# Patient Record
Sex: FEMALE | Race: WHITE | NOT HISPANIC OR LATINO | Employment: FULL TIME | URBAN - NONMETROPOLITAN AREA
[De-identification: names, ages, dates, MRNs, and addresses within clinical notes are randomized per-mention and may not be internally consistent; named-entity substitution may affect disease eponyms.]

---
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
## 2018-04-13 ENCOUNTER — TELEPHONE (OUTPATIENT)
Dept: INTERNAL MEDICINE | Facility: CLINIC | Age: 18
End: 2018-04-13

## 2018-04-13 NOTE — TELEPHONE ENCOUNTER
Patient called requesting a referral to Dermatology for her acne.  She states she would like to see Dr. Bui if possible.

## 2018-04-17 DIAGNOSIS — L70.0 ACNE VULGARIS: Primary | ICD-10-CM

## 2018-05-31 ENCOUNTER — OFFICE VISIT (OUTPATIENT)
Dept: INTERNAL MEDICINE | Facility: CLINIC | Age: 18
End: 2018-05-31

## 2018-05-31 VITALS
BODY MASS INDEX: 22.32 KG/M2 | HEIGHT: 63 IN | SYSTOLIC BLOOD PRESSURE: 108 MMHG | TEMPERATURE: 99.2 F | HEART RATE: 81 BPM | OXYGEN SATURATION: 99 % | DIASTOLIC BLOOD PRESSURE: 60 MMHG | WEIGHT: 126 LBS

## 2018-05-31 DIAGNOSIS — J02.0 STREP PHARYNGITIS: Primary | ICD-10-CM

## 2018-05-31 LAB
EXPIRATION DATE: ABNORMAL
INTERNAL CONTROL: ABNORMAL
Lab: ABNORMAL
S PYO AG THROAT QL: POSITIVE

## 2018-05-31 PROCEDURE — 87880 STREP A ASSAY W/OPTIC: CPT | Performed by: PHYSICIAN ASSISTANT

## 2018-05-31 PROCEDURE — 99213 OFFICE O/P EST LOW 20 MIN: CPT | Performed by: PHYSICIAN ASSISTANT

## 2018-05-31 RX ORDER — AMOXICILLIN 875 MG/1
875 TABLET, COATED ORAL 2 TIMES DAILY
Qty: 20 TABLET | Refills: 0 | Status: SHIPPED | OUTPATIENT
Start: 2018-05-31 | End: 2019-01-29

## 2018-05-31 NOTE — PROGRESS NOTES
Tatyana Maldonado is a 17 y.o. female.     Subjective   History of Present Illness   Here today with concern of 2-3 days of intense sore throat and bilateral ear pain. She has also had some postnasal drip and cough for the last 5+ days which she is taking antihistamines for. She denies headaches, abdominal pain, fever, chills or SOA.     The following portions of the patient's history were reviewed and updated as appropriate: allergies, current medications, past family history, past medical history, past social history, past surgical history and problem list.    Review of Systems    Constitutional: Negative for appetite change, chills, fatigue, fever and unexpected weight change.   HENT:  postnasal drip, rhinorrhea, sore throat, ear pain.  Negative for congestion,hearing loss, nosebleeds, tinnitus and trouble swallowing.    Eyes: Negative for photophobia, discharge and visual disturbance.   Respiratory:  Cough. Negative for chest tightness, shortness of breath and wheezing.    Cardiovascular: Negative for chest pain, palpitations and leg swelling.   Gastrointestinal: Negative for abdominal distention, abdominal pain, blood in  stool, constipation, diarrhea, nausea and vomiting.   Endocrine: Negative for cold intolerance, heat intolerance, polydipsia, polyphagia and polyuria.   Musculoskeletal: Negative for arthralgias, back pain, joint swelling, myalgias, neck pain and neck stiffness.   Skin: Negative for color change, pallor, rash and wound.   Allergic/Immunologic: Negative for environmental allergies, food allergies and immunocompromised state.   Neurological: Negative for dizziness, tremors, seizures, weakness, numbness and headaches.   Hematological: Negative for adenopathy. Does not bruise/bleed easily.   Psychiatric/Behavioral: Negative for sleep disturbances, agitation, behavioral problems, confusion, hallucinations, self-injury and suicidal ideas. The patient is not nervous/anxious.      Objective    Physical  "Exam  Constitutional: Oriented to person, place, and time. Appears well-developed and well-nourished.   HENT: OP erythema without petechiae or exudate. Absent tonsils. TMs normal.   Head: Normocephalic and atraumatic.   Eyes: EOM are normal. Pupils are equal, round, and reactive to light.   Neck: Normal range of motion. Neck supple.   Cardiovascular: Normal rate, regular rhythm and normal heart sounds.    Pulmonary/Chest: Effort normal and breath sounds normal. No respiratory distress.  Has no wheezes or rales. Exhibits no chest wall tenderness.   Abdominal: Soft. Bowel sounds are normal. Exhibits no distension and no mass. There is no tenderness.   Musculoskeletal: Normal range of motion. Exhibits no tenderness.   Neurological: Alert and oriented to person, place, and time.   Skin: Skin is warm and dry.   Psychiatric: Has a normal mood and affect. Behavior is normal. Judgment and thought content normal.       /60   Pulse 81   Temp 99.2 °F (37.3 °C)   Ht 160 cm (62.99\")   Wt 57.2 kg (126 lb)   SpO2 99%   BMI 22.33 kg/m²     Nursing note and vitals reviewed.        Assessment/Plan   Tatyana was seen today for sore throat, sinus problem and cough.    Diagnoses and all orders for this visit:    Strep pharyngitis  -     amoxicillin (AMOXIL) 875 MG tablet; Take 1 tablet by mouth 2 (Two) Times a Day.  -     POC Rapid Strep A - positive    Increase PO fluid intake. Ibuprofen and/or Tylenol prn for pain and fever control.   Begin using new toothbrush after 2 days of antibiotic use.     Call or RTC if symptoms worsen or persist.                "

## 2018-07-10 ENCOUNTER — CLINICAL SUPPORT (OUTPATIENT)
Dept: INTERNAL MEDICINE | Facility: CLINIC | Age: 18
End: 2018-07-10

## 2018-07-10 ENCOUNTER — TELEPHONE (OUTPATIENT)
Dept: INTERNAL MEDICINE | Facility: CLINIC | Age: 18
End: 2018-07-10

## 2018-07-10 DIAGNOSIS — Z11.1 TUBERCULOSIS SCREENING: Primary | ICD-10-CM

## 2018-07-10 DIAGNOSIS — Z11.1 TUBERCULOSIS SCREENING: ICD-10-CM

## 2018-07-10 PROCEDURE — 86580 TB INTRADERMAL TEST: CPT | Performed by: PHYSICIAN ASSISTANT

## 2018-07-12 LAB
INDURATION: 0 MM (ref 0–10)
TB SKIN TEST: NEGATIVE

## 2019-01-29 ENCOUNTER — OFFICE VISIT (OUTPATIENT)
Dept: INTERNAL MEDICINE | Facility: CLINIC | Age: 19
End: 2019-01-29

## 2019-01-29 VITALS
TEMPERATURE: 98.5 F | HEIGHT: 63 IN | OXYGEN SATURATION: 99 % | BODY MASS INDEX: 22.32 KG/M2 | SYSTOLIC BLOOD PRESSURE: 110 MMHG | WEIGHT: 126 LBS | DIASTOLIC BLOOD PRESSURE: 70 MMHG | HEART RATE: 110 BPM

## 2019-01-29 DIAGNOSIS — N94.6 DYSMENORRHEA: Primary | ICD-10-CM

## 2019-01-29 PROCEDURE — 99213 OFFICE O/P EST LOW 20 MIN: CPT | Performed by: PHYSICIAN ASSISTANT

## 2019-01-29 RX ORDER — INFLUENZA A VIRUS A/SINGAPORE/GP1908/2015 IVR-180 (H1N1) ANTIGEN (MDCK CELL DERIVED, PROPIOLACTONE INACTIVATED), INFLUENZA A VIRUS A/NORTH CAROLINA/04/2016 (H3N2) HEMAGGLUTININ ANTIGEN (MDCK CELL DERIVED, PROPIOLACTONE INACTIVATED), INFLUENZA B VIRUS B/IOWA/06/2017 HEMAGGLUTININ ANTIGEN (MDCK CELL DERIVED, PROPIOLACTONE INACTIVATED), INFLUENZA B VIRUS B/SINGAPORE/INFTT-16-0610/2016 HEMAGGLUTININ ANTIGEN (MDCK CELL DERIVED, PROPIOLACTONE INACTIVATED) 15; 15; 15; 15 UG/.5ML; UG/.5ML; UG/.5ML; UG/.5ML
INJECTION, SUSPENSION INTRAMUSCULAR
Refills: 0 | COMMUNITY
Start: 2018-10-29 | End: 2019-01-29

## 2019-01-29 RX ORDER — ISOTRETINOIN 30 MG/1
30 CAPSULE, LIQUID FILLED ORAL 2 TIMES DAILY
Refills: 0 | COMMUNITY
Start: 2019-01-16 | End: 2020-06-25

## 2019-02-01 ENCOUNTER — TELEPHONE (OUTPATIENT)
Dept: INTERNAL MEDICINE | Facility: CLINIC | Age: 19
End: 2019-02-01

## 2019-02-01 RX ORDER — LEVONORGESTREL / ETHINYL ESTRADIOL AND ETHINYL ESTRADIOL 150-30(84)
1 KIT ORAL DAILY
Qty: 91 EACH | Refills: 3 | Status: SHIPPED | OUTPATIENT
Start: 2019-02-01 | End: 2019-02-11

## 2019-02-01 NOTE — TELEPHONE ENCOUNTER
I just sent her prescription. If it is denied please do a PA since she has been stable on this for over 1 year.

## 2019-02-05 ENCOUNTER — OFFICE VISIT (OUTPATIENT)
Dept: INTERNAL MEDICINE | Facility: CLINIC | Age: 19
End: 2019-02-05

## 2019-02-05 VITALS
WEIGHT: 126 LBS | SYSTOLIC BLOOD PRESSURE: 114 MMHG | OXYGEN SATURATION: 99 % | HEART RATE: 101 BPM | BODY MASS INDEX: 22.32 KG/M2 | DIASTOLIC BLOOD PRESSURE: 72 MMHG | TEMPERATURE: 98.4 F | HEIGHT: 63 IN

## 2019-02-05 DIAGNOSIS — J02.9 ACUTE PHARYNGITIS, UNSPECIFIED ETIOLOGY: Primary | ICD-10-CM

## 2019-02-05 PROBLEM — J06.9 ACUTE URI: Status: RESOLVED | Noted: 2017-12-28 | Resolved: 2019-02-05

## 2019-02-05 LAB
EXPIRATION DATE: NORMAL
EXPIRATION DATE: NORMAL
HETEROPH AB SER QL LA: NEGATIVE
INTERNAL CONTROL: NORMAL
INTERNAL CONTROL: NORMAL
Lab: NORMAL
Lab: NORMAL
S PYO AG THROAT QL: NEGATIVE

## 2019-02-05 PROCEDURE — 86308 HETEROPHILE ANTIBODY SCREEN: CPT | Performed by: PHYSICIAN ASSISTANT

## 2019-02-05 PROCEDURE — 87880 STREP A ASSAY W/OPTIC: CPT | Performed by: PHYSICIAN ASSISTANT

## 2019-02-05 PROCEDURE — 99213 OFFICE O/P EST LOW 20 MIN: CPT | Performed by: PHYSICIAN ASSISTANT

## 2019-02-05 RX ORDER — AMOXICILLIN 875 MG/1
875 TABLET, COATED ORAL 2 TIMES DAILY
Qty: 20 TABLET | Refills: 0 | Status: SHIPPED | OUTPATIENT
Start: 2019-02-05 | End: 2019-03-20

## 2019-02-11 RX ORDER — LEVONORGESTREL AND ETHINYL ESTRADIOL 0.15-0.03
1 KIT ORAL DAILY
Qty: 91 TABLET | Refills: 4 | Status: SHIPPED | OUTPATIENT
Start: 2019-02-11 | End: 2019-05-13

## 2019-03-20 ENCOUNTER — OFFICE VISIT (OUTPATIENT)
Dept: OBSTETRICS AND GYNECOLOGY | Facility: CLINIC | Age: 19
End: 2019-03-20

## 2019-03-20 VITALS
HEIGHT: 63 IN | SYSTOLIC BLOOD PRESSURE: 118 MMHG | BODY MASS INDEX: 22.39 KG/M2 | WEIGHT: 126.4 LBS | DIASTOLIC BLOOD PRESSURE: 70 MMHG

## 2019-03-20 DIAGNOSIS — N92.6 IRREGULAR MENSES: Primary | ICD-10-CM

## 2019-03-20 PROCEDURE — 99203 OFFICE O/P NEW LOW 30 MIN: CPT | Performed by: OBSTETRICS & GYNECOLOGY

## 2019-03-20 RX ORDER — GLYCOPYRROLATE 2 MG/1
2 TABLET ORAL 3 TIMES DAILY
COMMUNITY
End: 2019-12-09 | Stop reason: SDUPTHER

## 2019-03-20 NOTE — PROGRESS NOTES
Subjective   Chief Complaint   Patient presents with   • \[facility]\"" Care     new pt. pt here for birth control. states that she has some bad side effects from the last birth control that she was on and stopped taking them about a month ago. pt states that she did have some irregular bleeding while on her birth control as well.     Tatyana Maldonado is a 18 y.o. year old .  Patient's last menstrual period was 2019 (exact date).  She presents to be seen because of irregular menses.  Her gynecologist had told her that she possibly had endometriosis given some heavy periods and painful periods in the past.  She had been on a triphasic birth control pill for about a year.  Of note she is also taking isotretinoin.  She is virginal.  A couple months ago she had an episode of bloody mucus a week prior to her placebo week and then had pain with heavier bleeding that lasted about 5 days in duration.  Her primary care doctor told her to skip her head to the placebo week and then start the new pack.  She did have resumption of bleeding at that time.  Due to her insurance she had to discuss a different birth control pill option with her primary care physician's assistant. She prescribed 0.15mg/0.03mg due to the patient's insurance however patient only took this for about 1 week due to headaches.  Occurred about 1 month ago and she is currently not taking any contraception and her last.  Was .  Duration was 5 days.  She desires to see how her bleeding and pain is without being on the birth control pill.    OTHER THINGS SHE WANTS TO DISCUSS TODAY:  Nothing else    The following portions of the patient's history were reviewed and updated as appropriate:current medications, allergies, past family history, past medical history, past social history and past surgical history    Social History    Tobacco Use      Smoking status: Never Smoker    Review of Systems  Constitutional POS: nothing reported    NEG: anorexia  "or night sweats   Genitourinary POS: nothing reported    NEG: dysuria or hematuria   Gastointestinal POS: nothing reported    NEG: bloating, change in bowel habits, melena or reflux symptoms   Integument POS: nothing reported    NEG: moles that are changing in size, shape, color or rashes   Breast POS: nothing reported    NEG: persistent breast lump, skin dimpling or nipple discharge         Objective   /70   Ht 160 cm (62.99\")   Wt 57.3 kg (126 lb 6.4 oz)   LMP 02/26/2019 (Exact Date)   Breastfeeding? No   BMI 22.40 kg/m²     General:  well developed; well nourished  no acute distress                                 Lab Review   No data reviewed    Imaging   No data reviewed        Assessment   1. Irregular menses, improved     Plan   1. Reviewed the importance of contraception while taking isotretinoin if she does become sexually active.  2. Reviewed that irregular bleeding can be normal while on a birth control pill.  As long as pregnancy and STDs are ruled out we usually just monitor closely.  3. Urged patient to find out when she last got her Gardasil vaccine and how many vaccine she is due for.  She will call the office and let us know.  4. Viewed importance of starting cervical cancer screening with Paps at age 21.  5. The importance of keeping all planned follow-up and taking all medications as prescribed was emphasized.  6. Follow up for annual exam in 6  months to start paps    No orders of the defined types were placed in this encounter.         This note was electronically signed.    Lupis Geller MD  March 20, 2019    Note: Speech recognition transcription software may have been used to create portions of this document.  An attempt at proofreading has been made but errors in transcription could still be present.  "

## 2019-08-21 ENCOUNTER — OFFICE VISIT (OUTPATIENT)
Dept: INTERNAL MEDICINE | Facility: CLINIC | Age: 19
End: 2019-08-21

## 2019-08-21 VITALS
HEART RATE: 89 BPM | OXYGEN SATURATION: 100 % | BODY MASS INDEX: 22.32 KG/M2 | DIASTOLIC BLOOD PRESSURE: 67 MMHG | RESPIRATION RATE: 15 BRPM | SYSTOLIC BLOOD PRESSURE: 114 MMHG | TEMPERATURE: 98.3 F | WEIGHT: 126 LBS | HEIGHT: 63 IN

## 2019-08-21 DIAGNOSIS — R30.0 DYSURIA: Primary | ICD-10-CM

## 2019-08-21 DIAGNOSIS — M54.50 ACUTE BILATERAL LOW BACK PAIN WITHOUT SCIATICA: ICD-10-CM

## 2019-08-21 LAB
BILIRUB BLD-MCNC: NEGATIVE MG/DL
CLARITY, POC: ABNORMAL
COLOR UR: YELLOW
GLUCOSE UR STRIP-MCNC: NEGATIVE MG/DL
KETONES UR QL: NEGATIVE
LEUKOCYTE EST, POC: NEGATIVE
NITRITE UR-MCNC: NEGATIVE MG/ML
PH UR: 6 [PH] (ref 5–8)
PROT UR STRIP-MCNC: NEGATIVE MG/DL
RBC # UR STRIP: NEGATIVE /UL
SP GR UR: 1.01 (ref 1–1.03)
UROBILINOGEN UR QL: NORMAL

## 2019-08-21 PROCEDURE — 99213 OFFICE O/P EST LOW 20 MIN: CPT | Performed by: NURSE PRACTITIONER

## 2019-08-21 NOTE — PROGRESS NOTES
Chief Complaint / Reason:      Chief Complaint   Patient presents with   • Back Pain     fatigue and weakeness, fever       Subjective     HPI  Patient presents today with complaints of back pain, urinary frequency fatigue and fever and chills.  She states that symptoms started in the past few days and she noticed she had not been drinking enough water and started drinking more and she did start to feel somewhat better.  She denies any blood in urine but states that her urine was very concentrated.  Patient is on Accutane and does take birth control therefore her mouth is very dry.  Patient denies any history of kidney stones.  She does drink caffeine daily but states that she tries to limit her caffeine intake.  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  Patient denies any chance of pregnancy and states that she has never been sexually active.  She denies any vaginal discharge.   had some burning and urinary frequency.  Does have a history of ovarian cyst.  Denies constipation but reports occasional diarrhea.  History taken from: patient    PMH/FH/Social History were reviewed and updated appropriately in the electronic medical record.   Past Medical History:   Diagnosis Date   • Endometriosis    • GARDASIL COMPLETED #1/3     Thinks she has had 2 doses    • Ovarian cyst     history of cyst rupture      Past Surgical History:   Procedure Laterality Date   • CHOLECYSTECTOMY     • TONSILLECTOMY       Social History     Socioeconomic History   • Marital status: Single     Spouse name: Not on file   • Number of children: Not on file   • Years of education: Not on file   • Highest education level: Not on file   Tobacco Use   • Smoking status: Never Smoker   Substance and Sexual Activity   • Alcohol use: No   • Drug use: No   • Sexual activity: No     Family History   Problem Relation Age of Onset   • Diabetes Paternal Grandfather    • Diabetes Paternal Grandmother    • Breast cancer  Paternal Great-Grandmother 70   • Ovarian cancer Neg Hx    • Uterine cancer Neg Hx    • Colon cancer Neg Hx        Review of Systems:   Review of Systems   Constitutional: Positive for fatigue and fever.   Respiratory: Negative.    Cardiovascular: Negative.    Gastrointestinal: Positive for abdominal pain.   Genitourinary: Positive for dysuria and urgency.   Musculoskeletal: Positive for back pain.   Neurological: Negative.    Psychiatric/Behavioral: Positive for sleep disturbance and stress.         All other systems were reviewed and are negative.  Exceptions are noted in the subjective or above.      Objective     Vital Signs  Vitals:    08/21/19 1720   BP: 114/67   Pulse: 89   Resp: 15   Temp: 98.3 °F (36.8 °C)   SpO2: 100%       Body mass index is 22.32 kg/m².    Physical Exam   Constitutional: She is oriented to person, place, and time. She appears well-developed and well-nourished.   Cardiovascular: Normal rate, regular rhythm, normal heart sounds and intact distal pulses.   Pulmonary/Chest: Effort normal and breath sounds normal.   Abdominal: Soft. Bowel sounds are normal. There is tenderness in the right lower quadrant. There is no rigidity, no rebound, no guarding and no CVA tenderness.   Musculoskeletal: Normal range of motion. She exhibits tenderness.   Neurological: She is alert and oriented to person, place, and time. Coordination normal.   Skin: Skin is warm and dry. Capillary refill takes less than 2 seconds.   Psychiatric: She has a normal mood and affect. Her behavior is normal. Judgment and thought content normal.   Nursing note and vitals reviewed.           Results Review:    I reviewed the patient's new clinical results.   Office Visit on 08/21/2019   Component Date Value Ref Range Status   • Color 08/21/2019 Yellow  Yellow, Straw, Dark Yellow, Ladonna Final   • Clarity, UA 08/21/2019 Cloudy* Clear Final   • Specific Gravity  08/21/2019 1.015  1.005 - 1.030 Final   • pH, Urine 08/21/2019 6.0  5.0  - 8.0 Final   • Leukocytes 08/21/2019 Negative  Negative Final   • Nitrite, UA 08/21/2019 Negative  Negative Final   • Protein, POC 08/21/2019 Negative  Negative mg/dL Final   • Glucose, UA 08/21/2019 Negative  Negative, 1000 mg/dL (3+) mg/dL Final   • Ketones, UA 08/21/2019 Negative  Negative Final   • Urobilinogen, UA 08/21/2019 Normal  Normal Final   • Bilirubin 08/21/2019 Negative  Negative Final   • Blood, UA 08/21/2019 Negative  Negative Final         Medication Review:     Current Outpatient Medications:   •  Acetaminophen (TYLENOL PO), Take  by mouth., Disp: , Rfl:   •  Aspirin-Acetaminophen-Caffeine (EXCEDRIN PO), Take  by mouth., Disp: , Rfl:   •  glycopyrrolate (ROBINUL) 2 MG tablet, Take 2 mg by mouth 3 (Three) Times a Day., Disp: , Rfl:   •  IBUPROFEN PO, Take  by mouth., Disp: , Rfl:   •  MYORISAN 30 MG capsule, Take 30 mg by mouth 2 (Two) Times a Day., Disp: , Rfl: 0    Assessment/Plan   Tatyana was seen today for back pain.    Diagnoses and all orders for this visit:    Dysuria  -     POCT urinalysis dipstick, automated  -     Urine Culture - Urine, Urine, Clean Catch  Discussed worsening signs and symptoms with patient.  Recommend patient increase water intake and limit caffeine intake  Acute bilateral low back pain without sciatica  -     POCT urinalysis dipstick, automated  Recommend taking Motrin for fever or pain.  Discussed worsening signs and symptoms.       Return if symptoms worsen or fail to improve.    Maria Antonia Brasher, NIKUNJ  08/21/2019

## 2019-08-24 ENCOUNTER — OFFICE VISIT (OUTPATIENT)
Dept: INTERNAL MEDICINE | Facility: CLINIC | Age: 19
End: 2019-08-24

## 2019-08-24 VITALS
TEMPERATURE: 98.4 F | SYSTOLIC BLOOD PRESSURE: 128 MMHG | HEART RATE: 129 BPM | HEIGHT: 63 IN | WEIGHT: 126 LBS | BODY MASS INDEX: 22.32 KG/M2 | DIASTOLIC BLOOD PRESSURE: 70 MMHG | OXYGEN SATURATION: 100 %

## 2019-08-24 DIAGNOSIS — N75.1 BARTHOLIN'S GLAND ABSCESS: Primary | ICD-10-CM

## 2019-08-24 LAB
BACTERIA UR CULT: NO GROWTH
BACTERIA UR CULT: NORMAL

## 2019-08-24 PROCEDURE — 99213 OFFICE O/P EST LOW 20 MIN: CPT | Performed by: PHYSICIAN ASSISTANT

## 2019-08-24 RX ORDER — AMOXICILLIN AND CLAVULANATE POTASSIUM 875; 125 MG/1; MG/1
1 TABLET, FILM COATED ORAL 2 TIMES DAILY
Qty: 20 TABLET | Refills: 0 | Status: SHIPPED | OUTPATIENT
Start: 2019-08-24 | End: 2019-09-03

## 2019-08-24 NOTE — PROGRESS NOTES
Tatyana Maldonado is a 19 y.o. female.     Subjective   History of Present Illness   Here today with concern of pain and swelling in the left side of the external vaginal area.  She has been experiencing the symptoms for the last 3 days which worsened yesterday but is a little bit better today.  Yesterday she soaked in a tub of warm water which seemed to help some.  She has had swelling of the left sided Bartholin gland at least twice in the past although this time is different because she can see a white lesion in the area which has previously never been present.  She has never been sexually active.  No fever, chills or nausea.        The following portions of the patient's history were reviewed and updated as appropriate: allergies, current medications, past family history, past medical history, past social history, past surgical history and problem list.    Review of Systems   Constitutional: Negative for appetite change, chills, fatigue, fever and unexpected weight change.   Respiratory: Negative for cough, chest tightness, shortness of breath and wheezing.    Cardiovascular: Negative for chest pain, palpitations and leg swelling.   Gastrointestinal: Negative for blood in stool and nausea.   Genitourinary: Positive for genital sores and pelvic pain. Negative for difficulty urinating, dyspareunia, flank pain, vaginal bleeding and vaginal pain.   Allergic/Immunologic: Negative for immunocompromised state.   Neurological: Negative for dizziness, tremors, seizures, facial asymmetry, weakness and headaches.   Hematological: Negative for adenopathy. Does not bruise/bleed easily.   Psychiatric/Behavioral: Negative for agitation, confusion, dysphoric mood, self-injury and suicidal ideas. The patient is not nervous/anxious.          Objective    Physical Exam   Constitutional: She is oriented to person, place, and time. She appears well-developed and well-nourished. No distress.   Pulmonary/Chest: Effort normal.  "  Genitourinary:       Pelvic exam was performed with patient prone.   Neurological: She is alert and oriented to person, place, and time. Coordination normal.   Skin: She is not diaphoretic.   Psychiatric: She has a normal mood and affect. Her behavior is normal. Judgment and thought content normal.   Nursing note and vitals reviewed.        /70   Pulse (!) 129   Temp 98.4 °F (36.9 °C)   Ht 160 cm (62.99\")   Wt 57.2 kg (126 lb)   SpO2 100%   BMI 22.33 kg/m²     Nursing note and vitals reviewed.          Assessment/Plan   Tatyana was seen today for spot in groin area.    Diagnoses and all orders for this visit:    Bartholin's gland abscess  -     amoxicillin-clavulanate (AUGMENTIN) 875-125 MG per tablet; Take 1 tablet by mouth 2 (Two) Times a Day for 10 days.  -     Virus Culture - Swab, Bartholin Cyst  -     Culture, Routine - Swab, Bartholin Cyst      She was encouraged to soak in a tub with warm water, Epsom salt and antibacterial soap at least twice daily until symptoms resolve.  Return in 2 days if symptoms have failed to improve any at which time incision and drainage of the abscess may be attempted.    ER with any acutely worsening symptoms.           "

## 2019-08-26 ENCOUNTER — OFFICE VISIT (OUTPATIENT)
Dept: INTERNAL MEDICINE | Facility: CLINIC | Age: 19
End: 2019-08-26

## 2019-08-26 VITALS
HEIGHT: 63 IN | BODY MASS INDEX: 22.32 KG/M2 | DIASTOLIC BLOOD PRESSURE: 70 MMHG | WEIGHT: 126 LBS | OXYGEN SATURATION: 99 % | TEMPERATURE: 99 F | SYSTOLIC BLOOD PRESSURE: 108 MMHG | HEART RATE: 84 BPM

## 2019-08-26 DIAGNOSIS — N75.1 BARTHOLIN'S GLAND ABSCESS: Primary | ICD-10-CM

## 2019-08-26 PROCEDURE — 99212 OFFICE O/P EST SF 10 MIN: CPT | Performed by: PHYSICIAN ASSISTANT

## 2019-08-26 NOTE — PROGRESS NOTES
Tatyana Maldonado is a 19 y.o. female.     Subjective   History of Present Illness   Here today for follow-up of left-sided Bartholin gland abscess.  2 days ago she was seen here and Bartholin gland abscess was noted for which she was prescribed Augmentin and she encouraged to soak in warm water with Epsom salt and antibacterial soap a couple of times per day.  Yesterday she felt particularly bad with bothersome pain in the external vaginal area which continued through this morning, although since then she has felt significantly better and is no longer having any pain. She was previously experiencing external vaginal pain with urination which has also since resolved.  She denies any fever today.         The following portions of the patient's history were reviewed and updated as appropriate: allergies, current medications, past family history, past medical history, past social history, past surgical history and problem list.    Review of Systems   Constitutional: Negative for appetite change, chills, fatigue, fever and unexpected weight change.   Eyes: Negative for visual disturbance.   Respiratory: Negative for cough, chest tightness, shortness of breath and wheezing.    Cardiovascular: Negative for chest pain, palpitations and leg swelling.   Genitourinary: Positive for dysuria and genital sores. Negative for difficulty urinating, dyspareunia, flank pain, frequency, pelvic pain, urgency, vaginal discharge and vaginal pain.   Musculoskeletal: Negative for arthralgias, gait problem, neck pain and neck stiffness.   Skin: Negative for pallor and rash.   Allergic/Immunologic: Negative for immunocompromised state.   Neurological: Negative for dizziness, seizures, speech difficulty, weakness, light-headedness and headaches.   Hematological: Negative for adenopathy. Does not bruise/bleed easily.   Psychiatric/Behavioral: Negative for agitation, behavioral problems, dysphoric mood, self-injury and suicidal ideas. The patient  "is not nervous/anxious.          Objective    Physical Exam   Constitutional: She is oriented to person, place, and time. She appears well-developed and well-nourished. No distress.   HENT:   Head: Normocephalic and atraumatic.   Pulmonary/Chest: Effort normal.   Genitourinary:       There is tenderness and lesion on the left labia.   Neurological: She is alert and oriented to person, place, and time. Coordination normal.   Skin: She is not diaphoretic.   Psychiatric: She has a normal mood and affect. Her behavior is normal. Judgment and thought content normal.   Nursing note and vitals reviewed.        /70   Pulse 84   Temp 99 °F (37.2 °C)   Ht 160 cm (62.99\")   Wt 57.2 kg (126 lb)   SpO2 99%   BMI 22.33 kg/m²     Nursing note and vitals reviewed.          Assessment/Plan   Tatyana was seen today for follow-up.    Diagnoses and all orders for this visit:    Bartholin's gland abscess    symptoms improving with antibiotic use and with frequent soaks in warm water with Epsom salt and antibacterial soap. She was encouraged to continue both. Culture of the site has been obtained and she will be contacted once results are available for review.              "

## 2019-09-04 LAB
BACTERIA SPEC AEROBE CULT: NORMAL
BACTERIA SPEC CULT: NORMAL
VIRUS SPEC CULT: NORMAL

## 2019-09-25 PROBLEM — Z01.419 WELL WOMAN EXAM: Status: ACTIVE | Noted: 2019-09-25

## 2019-09-27 ENCOUNTER — OFFICE VISIT (OUTPATIENT)
Dept: OBSTETRICS AND GYNECOLOGY | Facility: CLINIC | Age: 19
End: 2019-09-27

## 2019-09-27 VITALS
HEIGHT: 63 IN | DIASTOLIC BLOOD PRESSURE: 80 MMHG | BODY MASS INDEX: 22.15 KG/M2 | WEIGHT: 125 LBS | SYSTOLIC BLOOD PRESSURE: 132 MMHG

## 2019-09-27 DIAGNOSIS — Z01.419 WELL WOMAN EXAM: Primary | ICD-10-CM

## 2019-09-27 DIAGNOSIS — Z23 NEED FOR HPV VACCINE: ICD-10-CM

## 2019-09-27 DIAGNOSIS — Z12.4 CERVICAL CANCER SCREENING: ICD-10-CM

## 2019-09-27 PROCEDURE — 90471 IMMUNIZATION ADMIN: CPT | Performed by: OBSTETRICS & GYNECOLOGY

## 2019-09-27 PROCEDURE — 99395 PREV VISIT EST AGE 18-39: CPT | Performed by: OBSTETRICS & GYNECOLOGY

## 2019-09-27 PROCEDURE — 90651 9VHPV VACCINE 2/3 DOSE IM: CPT | Performed by: OBSTETRICS & GYNECOLOGY

## 2019-09-27 NOTE — PROGRESS NOTES
Subjective   Chief Complaint   Patient presents with   • Gynecologic Exam     pt here for annual. pt states no c/o     Tatyana Maldonado is a 19 y.o. year old  presenting to be seen for her annual exam.     SEXUAL Hx:  She is not currently sexually active.  In the past year there she has not been sexually active.    Condoms are not needed because she is not sexually active.  She would not like to be screened for STD's at today's exam.  Current birth control method: abstinence.  She is happy with her current method of contraception and does not want to discuss alternative methods of contraception.  MENSTRUAL Hx:  Patient's last menstrual period was 2019 (within days).  In the past 6 months her cycles have been regular, predictable and occur monthly.  Her menstrual flow is typically normal.   Each month on average there are roughly 0 day(s) of very heavy flow.    Intermenstrual bleeding is absent.    Post-coital bleeding is absent.  Dysmenorrhea: none  PMS: none  Her cycles are not a source of concern for her that she wishes to discuss today.  HEALTH Hx:  She exercises regularly: no (but is planning to start exercising more ).  She wears her seat belt: yes.  She has concerns about domestic violence: no.  OTHER THINGS SHE WANTS TO DISCUSS TODAY:  Nothing else    The following portions of the patient's history were reviewed and updated as appropriate:problem list, current medications, allergies, past family history, past medical history, past social history and past surgical history.    Social History    Tobacco Use      Smoking status: Never Smoker      Smokeless tobacco: Never Used    Review of Systems  Constitutional POS: nothing reported    NEG: anorexia or night sweats   Genitourinary POS: nothing reported    NEG: dysuria or hematuria      Gastointestinal POS: nothing reported    NEG: bloating, change in bowel habits, melena or reflux symptoms   Integument POS: nothing reported    NEG: moles that are  "changing in size, shape, color or rashes   Breast POS: nothing reported    NEG: persistent breast lump, skin dimpling or nipple discharge        Objective   /80   Ht 160 cm (63\")   Wt 56.7 kg (125 lb)   LMP 08/28/2019 (Within Days)   Breastfeeding? No   BMI 22.14 kg/m²     General:  well developed; well nourished  no acute distress   Skin:  No suspicious lesions seen   Thyroid: normal to inspection and palpation   Breasts:  Examined in supine position  Symmetric without masses or skin dimpling  Nipples normal without inversion, lesions or discharge  There are no palpable axillary nodes   Abdomen: soft, non-tender; no masses  no umbilical or inguinal hernias are present  no hepato-splenomegaly   Pelvis: Clinical staff was present for exam  External genitalia:  normal appearance of the external genitalia including Bartholin's and Meade's glands.  :  urethral meatus normal;  Vaginal:  normal pink mucosa without prolapse or lesions.  Cervix:  did not visualize it because of patient intolerance to speculum   Uterus:  normal size, shape and consistency.  Adnexa:  normal bimanual exam of the adnexa.  Rectal:  digital rectal exam not performed; anus visually normal appearing.        Assessment   1. Normal GYN exam  2. Immunization counseling     Plan   1. Pap and STD testing was done today with [facility]AB.  If she does not receive the results of the Pap within 2 weeks  time, she was instructed to call to find out the results.  I explained to Tatyana that the recommendations for Pap smear interval in a low risk patient's has lengthened to 3 years time.  I encouraged her to be seen yearly for a full physical exam including breast and pelvic exam even during the off years when PAP's will not be performed.  2. Will call to start birth control 3-6 months before wedding in June.   3. Complete gardasil today   4. No prescription was given or electronically sent at today's visit  5. The importance of keeping all " planned follow-up and taking all medications as prescribed was emphasized.  6. Follow up for annual exam in one year    No orders of the defined types were placed in this encounter.         This note was electronically signed.    Lupis Geller MD  September 27, 2019    Note: Speech recognition transcription software may have been used to create portions of this document.  An attempt at proofreading has been made but errors in transcription could still be present.

## 2019-10-08 PROBLEM — R87.610 ATYPICAL SQUAMOUS CELLS OF UNDETERMINED SIGNIFICANCE (ASCUS) ON PAPANICOLAOU SMEAR OF CERVIX: Status: ACTIVE | Noted: 2019-10-08

## 2019-11-01 ENCOUNTER — OFFICE VISIT (OUTPATIENT)
Dept: INTERNAL MEDICINE | Facility: CLINIC | Age: 19
End: 2019-11-01

## 2019-11-01 VITALS
DIASTOLIC BLOOD PRESSURE: 74 MMHG | TEMPERATURE: 98.2 F | OXYGEN SATURATION: 98 % | WEIGHT: 129 LBS | SYSTOLIC BLOOD PRESSURE: 118 MMHG | HEIGHT: 63 IN | BODY MASS INDEX: 22.86 KG/M2 | HEART RATE: 97 BPM

## 2019-11-01 DIAGNOSIS — F41.9 ANXIETY: Primary | ICD-10-CM

## 2019-11-01 PROCEDURE — 99213 OFFICE O/P EST LOW 20 MIN: CPT | Performed by: PHYSICIAN ASSISTANT

## 2019-11-01 RX ORDER — INFLUENZA VIRUS VACCINE 15; 15; 15; 15 UG/.5ML; UG/.5ML; UG/.5ML; UG/.5ML
SUSPENSION INTRAMUSCULAR
Refills: 0 | COMMUNITY
Start: 2019-10-16 | End: 2019-11-01

## 2019-11-01 RX ORDER — BUSPIRONE HYDROCHLORIDE 5 MG/1
TABLET ORAL
Qty: 180 TABLET | Refills: 1 | Status: SHIPPED | OUTPATIENT
Start: 2019-11-01 | End: 2020-09-29

## 2019-11-01 NOTE — PROGRESS NOTES
"Tatyana Maldonado is a 19 y.o. female.     Subjective   History of Present Illness   Here today with concern of anxiety.  She reports that she has had several panic attacks over the last 6 months with associated tremors, crying episodes, palpitations and hyperventilating.  These episodes occur sometimes \"out of the blue\" but sometimes builds over the course of 1 or more days.  She has had trouble falling asleep at night.  She notes she has been staying stressed and anxious most days of the week due to a heavy college course load and work.  She is staying fatigued and wants to sleep all of time.  She has found herself avoiding things she previously enjoyed doing.  She denies feeling down, depressed or hopeless.  No SI or HI.        The following portions of the patient's history were reviewed and updated as appropriate: allergies, current medications, past family history, past medical history, past social history, past surgical history and problem list.    Review of Systems   Constitutional: Negative for appetite change, chills, fatigue, fever and unexpected weight change.   Eyes: Negative for visual disturbance.   Respiratory: Negative for cough, chest tightness, shortness of breath and wheezing.         Rapid breathing with panic attacks   Cardiovascular: Positive for palpitations (with panic attacks). Negative for chest pain and leg swelling.   Gastrointestinal: Negative for abdominal pain, blood in stool, diarrhea, rectal pain and vomiting.   Endocrine: Negative for cold intolerance, heat intolerance, polydipsia, polyphagia and polyuria.   Genitourinary: Negative.    Musculoskeletal: Negative for gait problem and myalgias.   Skin: Negative for pallor, rash and wound.   Allergic/Immunologic: Negative for immunocompromised state.   Neurological: Positive for tremors ( With panic attacks). Negative for dizziness, seizures, speech difficulty, weakness and headaches.   Psychiatric/Behavioral: Positive for agitation, " "dysphoric mood (mild) and sleep disturbance. Negative for confusion, decreased concentration, hallucinations, self-injury and suicidal ideas. The patient is nervous/anxious. The patient is not hyperactive.         Panic attacks  Crying episodes         Objective    Physical Exam   Constitutional: She is oriented to person, place, and time. She appears well-developed and well-nourished. No distress.   HENT:   Head: Normocephalic and atraumatic.   Eyes: Conjunctivae and EOM are normal. Pupils are equal, round, and reactive to light.   Neck: Normal range of motion. Neck supple.   Cardiovascular: Normal rate, regular rhythm and normal heart sounds. Exam reveals no gallop and no friction rub.   No murmur heard.  Pulmonary/Chest: Effort normal and breath sounds normal. No respiratory distress. She has no wheezes. She has no rales.   Abdominal: Soft. Bowel sounds are normal. There is no tenderness.   Musculoskeletal: Normal range of motion. She exhibits no edema, tenderness or deformity.   Neurological: She is alert and oriented to person, place, and time. She displays normal reflexes. No cranial nerve deficit or sensory deficit. She exhibits normal muscle tone. Coordination normal.   Skin: Skin is warm and dry. Capillary refill takes less than 2 seconds. No rash noted. She is not diaphoretic.   Psychiatric: She has a normal mood and affect. Her behavior is normal. Judgment and thought content normal.   Nursing note and vitals reviewed.        /74   Pulse 97   Temp 98.2 °F (36.8 °C)   Ht 160 cm (62.99\")   Wt 58.5 kg (129 lb)   SpO2 98%   BMI 22.86 kg/m²     Nursing note and vitals reviewed.        Assessment/Plan   Tatyana was seen today for anxiety.    Diagnoses and all orders for this visit:    Anxiety  -     busPIRone (BUSPAR) 5 MG tablet; Take 1-2 tablets by mouth every 8 hours as needed for anxiety.      Discussed potential use of an SSRI/SNRI if BuSpar does not offer sufficient control of symptoms.    All " return to clinic if symptoms worsen or persist.

## 2019-12-09 RX ORDER — GLYCOPYRROLATE 2 MG/1
2 TABLET ORAL 3 TIMES DAILY
Qty: 90 TABLET | Refills: 2 | Status: SHIPPED | OUTPATIENT
Start: 2019-12-09 | End: 2020-11-12 | Stop reason: SDUPTHER

## 2020-01-29 ENCOUNTER — OFFICE VISIT (OUTPATIENT)
Dept: OBSTETRICS AND GYNECOLOGY | Facility: CLINIC | Age: 20
End: 2020-01-29

## 2020-01-29 VITALS
WEIGHT: 126.8 LBS | DIASTOLIC BLOOD PRESSURE: 64 MMHG | HEIGHT: 63 IN | BODY MASS INDEX: 22.47 KG/M2 | SYSTOLIC BLOOD PRESSURE: 98 MMHG

## 2020-01-29 DIAGNOSIS — Z30.09 GENERAL COUNSELING AND ADVICE ON CONTRACEPTIVE MANAGEMENT: Primary | ICD-10-CM

## 2020-01-29 DIAGNOSIS — Z23 NEED FOR HPV VACCINATION: ICD-10-CM

## 2020-01-29 PROCEDURE — 90471 IMMUNIZATION ADMIN: CPT | Performed by: OBSTETRICS & GYNECOLOGY

## 2020-01-29 PROCEDURE — 99213 OFFICE O/P EST LOW 20 MIN: CPT | Performed by: OBSTETRICS & GYNECOLOGY

## 2020-01-29 PROCEDURE — 90651 9VHPV VACCINE 2/3 DOSE IM: CPT | Performed by: OBSTETRICS & GYNECOLOGY

## 2020-01-29 RX ORDER — NORETHINDRONE ACETATE AND ETHINYL ESTRADIOL 1MG-20(21)
1 KIT ORAL DAILY
Qty: 84 TABLET | Refills: 4 | Status: SHIPPED | OUTPATIENT
Start: 2020-01-29 | End: 2020-02-17 | Stop reason: SDUPTHER

## 2020-01-29 NOTE — PROGRESS NOTES
"Subjective   Chief Complaint   Patient presents with   • Follow-up     pt would like to talk about starting birth control. also getting 3rd HPV immunization.     Tatyana Maldonado is a 19 y.o. year old .  Patient's last menstrual period was 2019 (exact date).  She presents to be seen because of desire to discuss contraceptive options that she is getting  this .  She reports she is tolerated pills in the past and is most likely considering that and wants to try and avoid it.  The week of her wedding and after her wedding.  She does have a history of migraines but no aura.  She did get her third HPV immunization today before I saw her.    OTHER THINGS SHE WANTS TO DISCUSS TODAY:  Nothing else    The following portions of the patient's history were reviewed and updated as appropriate:current medications and allergies    Social History    Tobacco Use      Smoking status: Never Smoker      Smokeless tobacco: Never Used    Review of Systems  Constitutional POS: nothing reported    NEG: anorexia or night sweats   Genitourinary POS: nothing reported    NEG: dysuria or hematuria   Gastointestinal POS: nothing reported    NEG: bloating, change in bowel habits, melena or reflux symptoms   Integument POS: nothing reported    NEG: moles that are changing in size, shape, color or rashes   Breast POS: nothing reported    NEG: persistent breast lump, skin dimpling or nipple discharge         Objective   BP 98/64   Ht 160 cm (63\")   Wt 57.5 kg (126 lb 12.8 oz)   LMP 2019 (Exact Date)   Breastfeeding No   BMI 22.46 kg/m²     General:  well developed; well nourished  no acute distress                                 Lab Review   No data reviewed    Imaging   No data reviewed        Assessment   1. Contraceptive counseling  2. Completion of HPV vaccine     Plan   1. Reviewed contraceptive options using a tiered approach starting with long-acting reversible contraception.  After an in-depth discussion " patient desires to start a combined hormonal contraceptive pill.  Given that she was to try to void.  I told her to take these continuously but that she can expect some unscheduled bleeding in the first 2-3 cycles.  2. She was instructed how to start her birth control.  It can be started at any time.  Because it may take 1 month to become effective, the use of alternative contraception for one month was stressed.  The potential for breakthrough bleeding for up to 4 cycles was also emphasized.  3. The importance of keeping all planned follow-up and taking all medications as prescribed was emphasized.  4. Follow up for annual exam as already scheduled in September after her wedding.    New Medications Ordered This Visit   Medications   • HPV 9-Valent Recomb Vaccine suspension 0.5 mg     Third dose of the series.   • norethindrone-ethinyl estradiol FE (JUNEL FE 1/20) 1-20 MG-MCG per tablet     Sig: Take 1 tablet by mouth Daily.     Dispense:  84 tablet     Refill:  4          This note was electronically signed.    Lupis Geller MD  January 29, 2020    Note: Speech recognition transcription software may have been used to create portions of this document.  An attempt at proofreading has been made but errors in transcription could still be present.

## 2020-02-17 ENCOUNTER — TELEPHONE (OUTPATIENT)
Dept: OBSTETRICS AND GYNECOLOGY | Facility: CLINIC | Age: 20
End: 2020-02-17

## 2020-02-17 RX ORDER — NORETHINDRONE ACETATE AND ETHINYL ESTRADIOL 1MG-20(21)
1 KIT ORAL DAILY
Qty: 84 TABLET | Refills: 4 | Status: SHIPPED | OUTPATIENT
Start: 2020-02-17 | End: 2020-09-29 | Stop reason: SDUPTHER

## 2020-02-17 NOTE — TELEPHONE ENCOUNTER
----- Message from Christopher Eli MA sent at 2/17/2020  3:32 PM EST -----  Regarding: FW: Prescription Question  Contact: [phone]      ----- Message -----  From: Tatyana Maldonado  Sent: 2/17/2020   3:20 PM EST  To: [facility] Clinical [facility]  Subject: Prescription Question                            Irvin Geller,    I went to  my birth control prescription for next month but my insurance wouldn't let me pick it up early. I remember you telling me that I could skip the placebo week and start the new pack. The pharmacy said that the prescription may need to be written to say to skip the placebo week for insurance purposes so I could pick it up earlier. Do you think that would be possible? Also do you recommend me skipping the placebo week for a certain amount of months?    Thanks,  Tatyana Maldonado

## 2020-02-17 NOTE — TELEPHONE ENCOUNTER
If she wants to try to be off of her period during a certain time then she can skip placebo week until that time is over. Rx resent to pharmacy to reflect his.     New Medications Ordered This Visit   Medications   • norethindrone-ethinyl estradiol FE (JUNEL FE 1/20) 1-20 MG-MCG per tablet     Sig: Take 1 tablet by mouth Daily. For three weeks then immediately start new pack and skip placebo week.     Dispense:  84 tablet     Refill:  4     Thanks,   Lupis Geller MD

## 2020-06-25 ENCOUNTER — OFFICE VISIT (OUTPATIENT)
Dept: INTERNAL MEDICINE | Facility: CLINIC | Age: 20
End: 2020-06-25

## 2020-06-25 VITALS
OXYGEN SATURATION: 99 % | HEIGHT: 63 IN | SYSTOLIC BLOOD PRESSURE: 100 MMHG | WEIGHT: 122 LBS | HEART RATE: 98 BPM | BODY MASS INDEX: 21.62 KG/M2 | TEMPERATURE: 98.2 F | DIASTOLIC BLOOD PRESSURE: 60 MMHG

## 2020-06-25 DIAGNOSIS — R09.82 ALLERGIC RHINITIS WITH POSTNASAL DRIP: ICD-10-CM

## 2020-06-25 DIAGNOSIS — J30.9 ALLERGIC RHINITIS WITH POSTNASAL DRIP: ICD-10-CM

## 2020-06-25 DIAGNOSIS — H65.93 OME (OTITIS MEDIA WITH EFFUSION), BILATERAL: Primary | ICD-10-CM

## 2020-06-25 PROCEDURE — 99213 OFFICE O/P EST LOW 20 MIN: CPT | Performed by: PHYSICIAN ASSISTANT

## 2020-06-25 RX ORDER — FLUTICASONE PROPIONATE 50 MCG
2 SPRAY, SUSPENSION (ML) NASAL DAILY
Qty: 1 BOTTLE | Refills: 5 | Status: SHIPPED | OUTPATIENT
Start: 2020-06-25 | End: 2021-07-29 | Stop reason: SDUPTHER

## 2020-09-29 ENCOUNTER — OFFICE VISIT (OUTPATIENT)
Dept: OBSTETRICS AND GYNECOLOGY | Facility: CLINIC | Age: 20
End: 2020-09-29

## 2020-09-29 VITALS
BODY MASS INDEX: 21.48 KG/M2 | HEIGHT: 63 IN | WEIGHT: 121.2 LBS | SYSTOLIC BLOOD PRESSURE: 102 MMHG | DIASTOLIC BLOOD PRESSURE: 74 MMHG

## 2020-09-29 DIAGNOSIS — Z01.419 WELL WOMAN EXAM WITH ROUTINE GYNECOLOGICAL EXAM: Primary | ICD-10-CM

## 2020-09-29 DIAGNOSIS — M62.89 PELVIC FLOOR TENSION: ICD-10-CM

## 2020-09-29 PROCEDURE — 99395 PREV VISIT EST AGE 18-39: CPT | Performed by: OBSTETRICS & GYNECOLOGY

## 2020-09-29 RX ORDER — NORETHINDRONE ACETATE AND ETHINYL ESTRADIOL 1MG-20(21)
1 KIT ORAL DAILY
Qty: 84 TABLET | Refills: 4 | Status: SHIPPED | OUTPATIENT
Start: 2020-09-29 | End: 2021-11-05 | Stop reason: SDUPTHER

## 2020-09-29 NOTE — PROGRESS NOTES
Subjective   Chief Complaint   Patient presents with   • Gynecologic Exam     annual. last pap 19 ascus. pt wants to talk about her birth control and acne.     Tatyana Maldonado is a 20 y.o. year old  presenting to be seen for her annual exam.     SEXUAL Hx:  She is currently sexually active.  In the past year there there has been NO new sexual partners.    Condoms are never used.  She would like to be screened for STD's at today's exam.  Current birth control method: OCP (estrogen/progesterone).  She is happy with her current method of contraception and does not want to discuss alternative methods of contraception.  MENSTRUAL Hx:  No LMP recorded.  In the past 6 months her cycles have been regular, predictable and occur monthly.  Her menstrual flow is typically normal.   Each month on average there are roughly 0 day(s) of very heavy flow.    Intermenstrual bleeding is absent.    Post-coital bleeding is n/a.  Dysmenorrhea: none  PMS: none  Her cycles are not a source of concern for her that she wishes to discuss today.  HEALTH Hx:  She exercises regularly: no (and has no plans to become more active).  She wears her seat belt: yes.  She has concerns about domestic violence: no.  OTHER THINGS SHE WANTS TO DISCUSS TODAY:  Nothing else    The following portions of the patient's history were reviewed and updated as appropriate:problem list, current medications, allergies, past family history, past medical history, past social history and past surgical history.    Social History    Tobacco Use      Smoking status: Never Smoker      Smokeless tobacco: Never Used    Review of Systems  Constitutional POS: nothing reported    NEG: anorexia or night sweats   Genitourinary POS: nothing reported    NEG: dysuria or hematuria      Gastointestinal POS: nothing reported    NEG: bloating, change in bowel habits, melena or reflux symptoms   Integument POS: nothing reported    NEG: moles that are changing in size, shape,  "color or rashes   Breast POS: nothing reported    NEG: persistent breast lump, skin dimpling or nipple discharge        Objective   /74   Ht 160 cm (63\")   Wt 55 kg (121 lb 3.2 oz)   BMI 21.47 kg/m²     General:  well developed; well nourished  no acute distress   Skin:  No suspicious lesions seen   Thyroid: normal to inspection and palpation   Breasts:  Examined in supine position  Symmetric without masses or skin dimpling  Nipples normal without inversion, lesions or discharge  There are no palpable axillary nodes   Abdomen: soft, non-tender; no masses  no umbilical or inguinal hernias are present  no hepato-splenomegaly   Pelvis: Clinical staff was present for exam  External genitalia:  normal appearance of the external genitalia including Bartholin's and [place]'s glands.  :  urethral meatus normal;  Vaginal:  unable to tolerate speculum exam so blind pap performed  Cervix:  not seen due to no speculum exam  Uterus:  normal size, shape and consistency.  Adnexa:  normal bimanual exam of the adnexa.  Rectal:  digital rectal exam not performed; anus visually normal appearing.        Assessment   1. Normal GYN exam  2. Oral contraceptive pill surveillance  3. Pelvic floor tension - intolerable to pelvic exam      Plan   Pap was done today.  If she does not receive the results of the Pap within 2 weeks  time, she was instructed to call to find out the results.  I explained to Tatyana that the recommendations for Pap smear interval in a low risk patient's has lengthened to 3 years time.  I encouraged her to be seen yearly for a full physical exam including breast and pelvic exam even during the off years when PAP's will not be performed.  Recommended pelvic floor physical therapy to help with pelvic floor tension in order to help with intercourse and pelvic exams and she is willing to try this.   Prescription(s) for OCP's were refilled today   The importance of keeping all planned follow-up and taking all " medications as prescribed was emphasized.  Follow up for annual exam in one year    New Medications Ordered This Visit   Medications   • norethindrone-ethinyl estradiol FE (Junel FE 1/20) 1-20 MG-MCG per tablet     Sig: Take 1 tablet by mouth Daily. For three weeks then immediately start new pack and skip placebo week.     Dispense:  84 tablet     Refill:  4          This note was electronically signed.    Lupis Geller MD  September 29, 2020    Note: Speech recognition transcription software may have been used to create portions of this document.  An attempt at proofreading has been made but errors in transcription could still be present.

## 2020-10-07 ENCOUNTER — TREATMENT (OUTPATIENT)
Dept: PHYSICAL THERAPY | Facility: CLINIC | Age: 20
End: 2020-10-07

## 2020-10-07 DIAGNOSIS — M62.89 HIGH-TONE PELVIC FLOOR DYSFUNCTION IN FEMALE: Primary | ICD-10-CM

## 2020-10-07 DIAGNOSIS — M62.838 MUSCLE SPASM: ICD-10-CM

## 2020-10-07 PROCEDURE — 97162 PT EVAL MOD COMPLEX 30 MIN: CPT | Performed by: PHYSICAL THERAPIST

## 2020-10-07 PROCEDURE — 97110 THERAPEUTIC EXERCISES: CPT | Performed by: PHYSICAL THERAPIST

## 2020-10-07 NOTE — PROGRESS NOTES
Physical Therapy Initial Evaluation and Plan of Care    Patient: Tatyana Maldonado   : 2000  Diagnosis/ICD-10 Code:  High-tone pelvic floor dysfunction in female [N94.89]  Referring practitioner: Lupis Geller MD  Date of Initial Visit: 10/7/2020  Today's Date: 10/7/2020  Patient seen for 1 sessions      Subjective    Pt states she has not had vaginal intercourse because scared due to history of pain with gyno exam up to 10/10. When they try to open speculum feels like its pinching her. Has had 2 pap smears blind and more tolerable than with speculum. She is able to tolerate the doctor putting her finger in but it is still painful. States she tenses up really bad and doesn't know if this is just due to her anxiety. Feels doesn't know how to relax. Tries to use tampons but it is uncomfortable putting it in and pulling out. /10 pain with tampon use. Feels tensing up. Pt got  recently in  and has never had any penetrative sexual activity. Several years on birth control for endometriosis symptoms    Chief Complaint:   Chief Complaint   Patient presents with   • Pelvis - Pain, Initial Evaluation      Functional Outcome Measure: VPQ: 7        Bladder function:  No issues per Pt  Urination at night: 0x/night  Frequency of urination: 2-3 times/ day  Discomfort with urination: none  Fluid irritants consumed daily: <16 oz of water per day; tea 1 glass per day or EOD; soda 16 oz bottle per day    Bowel function:  Constipation; IBS   How many BM's/day: every other day  Inlet Stool Scale Type: 1-3 straining quite a lot  Discomfort with BM: sometimes painful      Sexual activity  Sexually active: no penetration hasn't tried it yet; outercourse is fine no pain.      Prior PT or other treatment: none    Diagnostics:    PMH:   Past Medical History:   Diagnosis Date   • Anxiety    • Endometriosis    • GARDASIL COMPLETED #1/3     Thinks she has had 2 doses    • Hx of gastroesophageal reflux (GERD)    •  Migraines     Maybe 2x a month. no aura   • Ovarian cyst     history of cyst rupture         Surgical HX:   Past Surgical History:   Procedure Laterality Date   • LAPAROSCOPIC CHOLECYSTECTOMY     • TONSILLECTOMY     • WISDOM TOOTH EXTRACTION  May 2016        Menstrual cycle: regular monthly    Birth HX:     Meds:   Current Outpatient Medications:   •  fluticasone (Flonase) 50 MCG/ACT nasal spray, 2 sprays into the nostril(s) as directed by provider Daily., Disp: 1 bottle, Rfl: 5  •  glycopyrrolate (ROBINUL) 2 MG tablet, Take 1 tablet by mouth 3 (Three) Times a Day., Disp: 90 tablet, Rfl: 2  •  norethindrone-ethinyl estradiol FE (Junel FE 1/20) 1-20 MG-MCG per tablet, Take 1 tablet by mouth Daily. For three weeks then immediately start new pack and skip placebo week., Disp: 84 tablet, Rfl: 4     Occupation: nurse    Activity level/exercise routine: not regularly exercising            Objective      verbal consent obtained for internal pelvic exam/treatment with declined need for second person in room    Palpation:   Supine:   Abdominals, Iliacus, psoas - mod tension B with 3/10 TTP  Adductors - mild tension B proximally    External:    Ischiocavernosus - mild tension   Supf and deep transverse perineal mm - mild tension B   Perineal body - hypomobile   Sensation intact B   Skin intact - no gross abnormalities    Internal:   Sensation: intact  Bulge: incomplete  Knack: intact  Wall Laxity: unremarkable     L/R supf mm: severe tension - 6/10 TTP   Levator ani: moderate tension B - 4/10 TTP   Obturator internus: mild tension B      MMT Strength: 2/5      See flowsheet for details of treatment following evaluation.        Assessment/Plan:     The patient is a 20 y.o. female who presents to physical therapy today for pain with gynecologic exams and tampon use and concerns about future pain with intercourse. Upon initial evaluation, the patient demonstrates the following impairments: moderate to severe tension of  pelvic floor mm, with increased activation with insertion into vaginal canal. Due to these impairments, the patient is unable to undergo normal gynecology exams/health screenings. The patient would benefit from skilled pelvic PT services to address functional limitations and impairments and to improve patient quality of life.      Goals:   STG's: 4 weeks  · Patient will report > 25% reduction in overall pain   · Patient will demonstrate ability to relax pelvic floor mm 5/5 trials  · Patient will be I with dilator program for progress toward LTG  · Patient will be able to perform HEP with minimal verbal cues    LTG's: By discharge  · Patient will be able to tolerate an internal pelvic exam/vaginal penetration with pain <2/10   · Patient will be able to have penetrative intercourse with no more than 2/10 discomfort for improved intimacy with spouse  · Patient will be independent with HEP      Plan  Therapy options: will be seen for skilled physical therapy services  Planned modality interventions: TENS, ultrasound, cryotherapy, thermotherapy (hydrocollator packs) and high voltage pulsed current (pain management)  Planned therapy interventions: abdominal trunk stabilization, manual therapy, neuromuscular re-education, body mechanics training, flexibility, functional ROM exercises, gait training, home exercise program, joint mobilization, therapeutic activities, stretching, strengthening, spinal/joint mobilization, soft tissue mobilization and postural training  Pt prognosis: good  Frequency: weekly  Duration in visits: 12  Duration in weeks: 12  Treatment plan discussed with: patient    1300/1345  Timed:         Manual Therapy:    0     mins  34779;     Therapeutic Exercise:    10     mins  61438;     Neuromuscular Lisa:    0    mins  74971;    Therapeutic Activity:     0     mins  88487;     Gait Trainin     mins  22258;     Ultrasound:     0     mins  85095;    Ionto                               0    mins    61565  Self Care                       0     mins   67153  Canalith Repos    0     mins 92408      Un-Timed:  Electrical Stimulation:    0     mins  85893 ( );  Dry Needling     0     mins self-pay  Traction     0     mins 62708  Low Eval     0     Mins  67740  Mod Eval     35     Mins  39065  High Eval                       0     Mins  37077  Re-Eval                           0    mins  80113        Timed Treatment:   10   mins   Total Treatment:     45   mins    PT SIGNATURE:Cheok Viera PT, DPT  DATE TREATMENT INITIATED: 10/7/2020    Initial Certification  Certification Period: 1/5/2021  I certify that the therapy services are furnished while this patient is under my care.  The services outlined above are required by this patient, and will be reviewed every 90 days.     PHYSICIAN: Lupis Geller MD      DATE: 10/07/2020     Please sign and return via fax to [phone]. Thank you, [facility].

## 2020-10-19 ENCOUNTER — TREATMENT (OUTPATIENT)
Dept: PHYSICAL THERAPY | Facility: CLINIC | Age: 20
End: 2020-10-19

## 2020-10-19 DIAGNOSIS — M62.89 HIGH-TONE PELVIC FLOOR DYSFUNCTION IN FEMALE: ICD-10-CM

## 2020-10-19 DIAGNOSIS — M62.838 MUSCLE SPASM: Primary | ICD-10-CM

## 2020-10-19 PROCEDURE — 97140 MANUAL THERAPY 1/> REGIONS: CPT | Performed by: PHYSICAL THERAPIST

## 2020-10-19 PROCEDURE — 97110 THERAPEUTIC EXERCISES: CPT | Performed by: PHYSICAL THERAPIST

## 2020-10-20 NOTE — PROGRESS NOTES
Physical Therapy Daily Progress Note  Patient: Tatyana Maldonado   : 2000  Diagnosis/ICD-10 Code:  Muscle spasm [M62.838]  Referring practitioner: Lupis Geller MD  Date of Initial Visit: Type: THERAPY  Noted: 10/7/2020  Today's Date: 10/20/2020  Patient seen for 2 sessions      Subjective   Tatyana Maldonado reports no change since last visit. She has been doing some stretching and it can be painful.  Pain Rating (0-10): 0      Objective   verbal consent obtained for external pelvic exam/treatment with declined need for second person in room     See Exercise, Manual, and Modality Logs for complete treatment.     Patient Education: benefits of treatment to pelvic girdle mm for decreasing pfm tension    Assessment/Plan  Pt tolerated manual with moderate discomfort that decreased with MFR and TPR. She was instructed in advanced stretching program to decrease pelvic floor and pelvic girdle mm tension to assist with decreasing pain with vaginal penetration. Pt did well with diaphragmatic breathing to assist with relaxation of pelvic floor mm. Will continue manual and progress stretching and downtraining as indicated to further reduce symptoms.     Progress per Plan of Care         1430/1515   Timed:         Manual Therapy:    32     mins  05610;     Therapeutic Exercise:    8     mins  01665;     Neuromuscular Lisa:    5    mins  93337;    Therapeutic Activity:     0     mins  90847;     Gait Trainin     mins  73257;     Ultrasound:     0     mins  55057;    Ionto                               0    mins   32353  Self Care                       0     mins   93323  Canalith Repos               0    mins  87687    Un-Timed:  Electrical Stimulation:    0     mins  42521 (MC );  Dry Needling     0     mins self-pay  Traction     0     mins 49146  Low Eval     0     Mins  88927  Mod Eval     0     Mins  97990  High Eval                       0     Mins  20678  Re-Eval                           0    mins   65266    Timed Treatment:   45   mins   Total Treatment:     45   mins    Cheko Viera, PT  [place] License # [id number]  Physical Therapist

## 2020-10-30 ENCOUNTER — TREATMENT (OUTPATIENT)
Dept: PHYSICAL THERAPY | Facility: CLINIC | Age: 20
End: 2020-10-30

## 2020-10-30 DIAGNOSIS — M62.838 MUSCLE SPASM: Primary | ICD-10-CM

## 2020-10-30 DIAGNOSIS — M62.89 HIGH-TONE PELVIC FLOOR DYSFUNCTION IN FEMALE: ICD-10-CM

## 2020-10-30 PROCEDURE — 97140 MANUAL THERAPY 1/> REGIONS: CPT | Performed by: PHYSICAL THERAPIST

## 2020-10-30 PROCEDURE — 97110 THERAPEUTIC EXERCISES: CPT | Performed by: PHYSICAL THERAPIST

## 2020-11-02 NOTE — PROGRESS NOTES
Physical Therapy Daily Progress Note  Patient: Tatyana Maldonado   : 2000  Diagnosis/ICD-10 Code:  Muscle spasm [M62.838]  Referring practitioner: Lupis Geller MD  Date of Initial Visit: Type: THERAPY  Noted: 10/7/2020  Today's Date: 2020  Patient seen for 3 sessions      Subjective   Tatyana Maldonado reports she still can't do the hip flexor stretch very well.   Pain Rating (0-10): 2      Objective   verbal consent obtained for internal pelvic exam/treatment with declined need for second person in room     See Exercise, Manual, and Modality Logs for complete treatment.       Assessment/Plan  Pt tolerated manual with moderate discomfort that decreased with MFR and TPR. Adjusted stretching program to decrease pelvic floor and pelvic girdle mm tension to assist with decreasing pain with vaginal penetration. Will continue manual and progress stretching and downtraining as indicated to further reduce symptoms.        Progress per Plan of Care         1300/1345   Timed:         Manual Therapy:    32     mins  43018;     Therapeutic Exercise:    8     mins  35295;     Neuromuscular Lisa:   5    mins  49680;    Therapeutic Activity:     0     mins  84045;     Gait Trainin     mins  25409;     Ultrasound:     0     mins  30741;    Ionto                               0    mins   74275  Self Care                       00     mins   28180  Canalith Repos               0    mins  77763    Un-Timed:  Electrical Stimulation:    0     mins  85751 ( );  Dry Needling     0     mins self-pay  Traction     0     mins 03465  Low Eval     0     Mins  40442  Mod Eval     0     Mins  15247  High Eval                       0     Mins  35955  Re-Eval                           0    mins  93676    Timed Treatment:   45   mins   Total Treatment:     45   mins    KAROLINA To License # [id number]  Physical Therapist

## 2020-11-12 ENCOUNTER — OFFICE VISIT (OUTPATIENT)
Dept: INTERNAL MEDICINE | Facility: CLINIC | Age: 20
End: 2020-11-12

## 2020-11-12 VITALS
WEIGHT: 123 LBS | SYSTOLIC BLOOD PRESSURE: 110 MMHG | DIASTOLIC BLOOD PRESSURE: 60 MMHG | BODY MASS INDEX: 21.79 KG/M2 | HEART RATE: 110 BPM | TEMPERATURE: 98 F | OXYGEN SATURATION: 100 % | HEIGHT: 63 IN

## 2020-11-12 DIAGNOSIS — L74.9 SWEAT GLAND DISORDER: ICD-10-CM

## 2020-11-12 DIAGNOSIS — F41.9 ANXIETY: ICD-10-CM

## 2020-11-12 DIAGNOSIS — Z00.00 ANNUAL PHYSICAL EXAM: Primary | ICD-10-CM

## 2020-11-12 DIAGNOSIS — G43.009 MIGRAINE WITHOUT AURA AND WITHOUT STATUS MIGRAINOSUS, NOT INTRACTABLE: ICD-10-CM

## 2020-11-12 PROCEDURE — 99395 PREV VISIT EST AGE 18-39: CPT | Performed by: PHYSICIAN ASSISTANT

## 2020-11-12 RX ORDER — SUMATRIPTAN 25 MG/1
TABLET, FILM COATED ORAL
Qty: 9 TABLET | Refills: 5 | Status: SHIPPED | OUTPATIENT
Start: 2020-11-12

## 2020-11-12 RX ORDER — GLYCOPYRROLATE 2 MG/1
2 TABLET ORAL NIGHTLY
Qty: 90 TABLET | Refills: 2 | Status: SHIPPED | OUTPATIENT
Start: 2020-11-12

## 2020-11-12 RX ORDER — BUSPIRONE HYDROCHLORIDE 5 MG/1
5-10 TABLET ORAL EVERY 8 HOURS PRN
COMMUNITY
Start: 2020-10-27 | End: 2020-11-12 | Stop reason: SDUPTHER

## 2020-11-12 RX ORDER — BUSPIRONE HYDROCHLORIDE 5 MG/1
5-10 TABLET ORAL EVERY 8 HOURS PRN
Qty: 180 TABLET | Refills: 3 | Status: SHIPPED | OUTPATIENT
Start: 2020-11-12 | End: 2022-03-01

## 2020-11-12 NOTE — PROGRESS NOTES
Subjective   Tatyana Vazquez is a 20 y.o. female and is here for a comprehensive physical exam. The patient reports problems - sweating.    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 Buspar has helped some. She has been out of Robinul for a few weeks and diaphoresis has returned. Acne has returned diffusely on her face. She has used Accutane in the past which she completed around 3/2020.     She was having headaches around once a week and also has had 1 migraine in the last month which is a rare occurrence. No aura. Excedrin or ibuprofen help unless it is a migraine.         Health Habits:  Eye exam within last 2 years? yes  Dental exam every 6 months? yes  Exercise habits: active job but not exercising  Healthy diet? attempting    The ASCVD Risk score (Oliverstephan REINA Jr., et al., 2013) failed to calculate for the following reasons:    The 2013 ASCVD risk score is only valid for ages 40 to 79        Do you take any herbs or supplements that were not prescribed by a doctor? yes, multivitamin  Are you taking calcium supplements? No  Are you taking aspirin daily? No     History:  LMP: No LMP recorded.  Menopause: No  Last pap date: 2020  Abnormal pap? no  Family history of breast or ovarian cancer: yes         OB History    Para Term  AB Living   0 0 0 0 0 0   SAB TAB Ectopic Molar Multiple Live Births   0 0 0   0     Obstetric Comments   S/p HPV Vaccine series       reports being sexually active and has had partner(s) who are Male. She reports using the following method of birth control/protection: OCP.        The following portions of the patient's history were reviewed and updated as appropriate: She  has a past medical history of Anxiety, Endometriosis, GARDASIL COMPLETED #1/3, gastroesophageal reflux (GERD) (), Migraines, and Ovarian cyst.  She does not have any pertinent problems on  file.  She  has a past surgical history that includes Tonsillectomy; Laparoscopic cholecystectomy (2011); and Holder tooth extraction (May 2016).  Her family history includes Breast cancer in her paternal grandmother; Breast cancer (age of onset: 70) in her paternal great-grandmother; Diabetes in her paternal grandfather and paternal grandmother; Hypertension in her paternal grandmother; Vasculitis in her mother.  She  reports that she has never smoked. She has never used smokeless tobacco. She reports current alcohol use. She reports that she does not use drugs.  Current Outpatient Medications   Medication Sig Dispense Refill   • busPIRone (BUSPAR) 5 MG tablet Take 1-2 tablets by mouth Every 8 (Eight) Hours As Needed (anxiety). 180 tablet 3   • fluticasone (Flonase) 50 MCG/ACT nasal spray 2 sprays into the nostril(s) as directed by provider Daily. 1 bottle 5   • glycopyrrolate (ROBINUL) 2 MG tablet Take 1 tablet by mouth Every Night. 90 tablet 2   • norethindrone-ethinyl estradiol FE (Junel FE 1/20) 1-20 MG-MCG per tablet Take 1 tablet by mouth Daily. For three weeks then immediately start new pack and skip placebo week. 84 tablet 4   • SUMAtriptan (Imitrex) 25 MG tablet Take one tablet at onset of headache. May repeat dose one time in 2 hours if headache not relieved. 9 tablet 5     No current facility-administered medications for this visit.        Review of Systems  Do you have pain that bothers you in your daily life? no    Review of Systems   Constitutional: Positive for diaphoresis. Negative for activity change, appetite change, chills, fatigue, fever, unexpected weight gain and unexpected weight loss.   HENT: Negative for congestion, dental problem, ear pain, mouth sores, postnasal drip, rhinorrhea, sinus pressure, sneezing, sore throat, swollen glands, trouble swallowing and voice change.    Eyes: Negative for blurred vision, double vision, pain, discharge, redness, itching and visual disturbance.  "  Respiratory: Negative for cough, choking, chest tightness, shortness of breath and wheezing.    Cardiovascular: Negative for chest pain, palpitations and leg swelling.   Gastrointestinal: Negative for abdominal distention, abdominal pain, blood in stool, constipation, diarrhea, nausea, rectal pain, vomiting, GERD and indigestion.   Endocrine: Negative for cold intolerance, heat intolerance, polydipsia, polyphagia and polyuria.   Genitourinary: Negative for breast discharge, breast lump, breast pain, decreased libido, decreased urine volume, difficulty urinating, dysuria, flank pain, frequency, hematuria, pelvic pain, urgency, vaginal bleeding and vaginal pain.   Musculoskeletal: Negative for arthralgias, back pain, gait problem, myalgias and neck pain.   Skin: Negative for color change, rash and skin lesions.        acne   Allergic/Immunologic: Positive for environmental allergies. Negative for immunocompromised state.   Neurological: Positive for headache. Negative for dizziness, tremors, facial asymmetry, speech difficulty, weakness, light-headedness, numbness, memory problem and confusion.   Hematological: Negative for adenopathy. Does not bruise/bleed easily.   Psychiatric/Behavioral: Negative for agitation, behavioral problems, decreased concentration, hallucinations, self-injury, sleep disturbance, suicidal ideas, negative for hyperactivity, depressed mood and stress. The patient is nervous/anxious.        Objective   /60   Pulse 110   Temp 98 °F (36.7 °C)   Ht 160 cm (62.99\")   Wt 55.8 kg (123 lb)   SpO2 100%   BMI 21.79 kg/m²     Physical Exam  Vitals signs and nursing note reviewed.   Constitutional:       General: She is not in acute distress.     Appearance: Normal appearance. She is well-developed and normal weight. She is not ill-appearing, toxic-appearing or diaphoretic.   HENT:      Head: Normocephalic and atraumatic.      Right Ear: External ear normal.      Left Ear: External ear " normal.      Nose: Nose normal.      Mouth/Throat:      Pharynx: No oropharyngeal exudate.   Eyes:      General: No scleral icterus.     Conjunctiva/sclera: Conjunctivae normal.      Pupils: Pupils are equal, round, and reactive to light.   Neck:      Musculoskeletal: Normal range of motion and neck supple.      Thyroid: No thyromegaly.   Cardiovascular:      Rate and Rhythm: Normal rate and regular rhythm.      Heart sounds: Normal heart sounds. No murmur. No friction rub. No gallop.    Pulmonary:      Effort: Pulmonary effort is normal. No respiratory distress.      Breath sounds: Normal breath sounds. No wheezing or rales.   Chest:      Chest wall: No tenderness.   Abdominal:      General: Bowel sounds are normal. There is no distension.      Palpations: Abdomen is soft. There is no mass.      Tenderness: There is no abdominal tenderness. There is no right CVA tenderness, left CVA tenderness or rebound.   Musculoskeletal: Normal range of motion.         General: No tenderness or deformity.      Right lower leg: No edema.      Left lower leg: No edema.   Lymphadenopathy:      Cervical: No cervical adenopathy.   Skin:     General: Skin is warm and dry.      Capillary Refill: Capillary refill takes less than 2 seconds.      Coloration: Skin is not pale.      Findings: No rash.      Comments: Acne of the face   Neurological:      General: No focal deficit present.      Mental Status: She is alert and oriented to person, place, and time.      Cranial Nerves: No cranial nerve deficit.      Sensory: No sensory deficit.      Gait: Gait normal.      Deep Tendon Reflexes: Reflexes normal.   Psychiatric:         Mood and Affect: Mood normal.         Behavior: Behavior normal.         Thought Content: Thought content normal.         Judgment: Judgment normal.            Assessment/Plan   Healthy female exam.     1.    Diagnosis Plan   1. Annual physical exam  CBC & Differential    Comprehensive Metabolic Panel     2. BMI  21.0-21.9, adult  Patient's Body mass index is 21.79 kg/m². BMI is within normal parameters. No follow-up required.     3. Sweat gland disorder  Resume: glycopyrrolate (ROBINUL) 2 MG tablet    CBC & Differential    Comprehensive Metabolic Panel    TSH Rfx On Abnormal To Free T4     4. Anxiety  Continue: busPIRone (BUSPAR) 5 MG tablet    TSH Rfx On Abnormal To Free T4     5. Migraine without aura and without status migrainosus, not intractable  Begin prn: SUMAtriptan (Imitrex) 25 MG tablet     Discuss acne with dermatologist.     2. Patient Counseling:  --Nutrition: Stressed importance of moderation in sodium/caffeine intake, saturated fat and cholesterol, caloric balance, sufficient intake of fresh fruits, vegetables, fiber, calcium, iron, and 1 g folate supplementation if of childbearing age.   --Discussed the issue of calcium supplement, and the daily use of baby aspirin if applicable.             --Mammogram recommended every 2 years from age 40-49 and yearly beginning at age 50.  --Exercise: Stressed the importance of regular exercise.   --Substance Abuse: Discussed cessation/primary prevention of tobacco (if applicable), alcohol, or other drug use (if applicable); driving or other dangerous activities under the influence; availability of treatment for abuse.    --Sexuality: Discussed sexually transmitted diseases, partner selection, use of condoms, avoidance of unintended pregnancy  and contraceptive alternatives.   --Injury prevention: Discussed safety belts, safety helmets, smoke detector, smoking near bedding or upholstery.   --Dental health: Discussed importance of regular tooth brushing, flossing, and dental visits every 6 months.  --Immunizations reviewed.  --Discussed benefits of screening colonoscopy (if applicable).  --After hours service discussed with patient.    3. Discussed the patient's BMI with her.  The BMI is in the acceptable range.  4. No follow-ups on file.         Anika Jaramillo,  PA  11/12/2020  13:04 EST

## 2020-11-13 ENCOUNTER — TREATMENT (OUTPATIENT)
Dept: PHYSICAL THERAPY | Facility: CLINIC | Age: 20
End: 2020-11-13

## 2020-11-13 DIAGNOSIS — M62.838 MUSCLE SPASM: Primary | ICD-10-CM

## 2020-11-13 DIAGNOSIS — M62.89 HIGH-TONE PELVIC FLOOR DYSFUNCTION IN FEMALE: ICD-10-CM

## 2020-11-13 LAB
ALBUMIN SERPL-MCNC: 4.4 G/DL (ref 3.5–5.2)
ALBUMIN/GLOB SERPL: 2.2 G/DL
ALP SERPL-CCNC: 52 U/L (ref 39–117)
ALT SERPL-CCNC: 16 U/L (ref 1–33)
AST SERPL-CCNC: 21 U/L (ref 1–32)
BASOPHILS # BLD AUTO: 0.07 10*3/MM3 (ref 0–0.2)
BASOPHILS NFR BLD AUTO: 1.4 % (ref 0–1.5)
BILIRUB SERPL-MCNC: 0.3 MG/DL (ref 0–1.2)
BUN SERPL-MCNC: 10 MG/DL (ref 6–20)
BUN/CREAT SERPL: 13.9 (ref 7–25)
CALCIUM SERPL-MCNC: 8.9 MG/DL (ref 8.6–10.5)
CHLORIDE SERPL-SCNC: 103 MMOL/L (ref 98–107)
CO2 SERPL-SCNC: 29.7 MMOL/L (ref 22–29)
CREAT SERPL-MCNC: 0.72 MG/DL (ref 0.57–1)
EOSINOPHIL # BLD AUTO: 0.17 10*3/MM3 (ref 0–0.4)
EOSINOPHIL NFR BLD AUTO: 3.3 % (ref 0.3–6.2)
ERYTHROCYTE [DISTWIDTH] IN BLOOD BY AUTOMATED COUNT: 11.9 % (ref 12.3–15.4)
GLOBULIN SER CALC-MCNC: 2 GM/DL
GLUCOSE SERPL-MCNC: 75 MG/DL (ref 65–99)
HCT VFR BLD AUTO: 38.1 % (ref 34–46.6)
HGB BLD-MCNC: 12.9 G/DL (ref 12–15.9)
IMM GRANULOCYTES # BLD AUTO: 0.01 10*3/MM3 (ref 0–0.05)
IMM GRANULOCYTES NFR BLD AUTO: 0.2 % (ref 0–0.5)
LYMPHOCYTES # BLD AUTO: 1.85 10*3/MM3 (ref 0.7–3.1)
LYMPHOCYTES NFR BLD AUTO: 35.9 % (ref 19.6–45.3)
MCH RBC QN AUTO: 30.3 PG (ref 26.6–33)
MCHC RBC AUTO-ENTMCNC: 33.9 G/DL (ref 31.5–35.7)
MCV RBC AUTO: 89.4 FL (ref 79–97)
MONOCYTES # BLD AUTO: 0.43 10*3/MM3 (ref 0.1–0.9)
MONOCYTES NFR BLD AUTO: 8.3 % (ref 5–12)
NEUTROPHILS # BLD AUTO: 2.63 10*3/MM3 (ref 1.7–7)
NEUTROPHILS NFR BLD AUTO: 50.9 % (ref 42.7–76)
NRBC BLD AUTO-RTO: 0 /100 WBC (ref 0–0.2)
PLATELET # BLD AUTO: 226 10*3/MM3 (ref 140–450)
POTASSIUM SERPL-SCNC: 4.4 MMOL/L (ref 3.5–5.2)
PROT SERPL-MCNC: 6.4 G/DL (ref 6–8.5)
RBC # BLD AUTO: 4.26 10*6/MM3 (ref 3.77–5.28)
SODIUM SERPL-SCNC: 139 MMOL/L (ref 136–145)
TSH SERPL DL<=0.005 MIU/L-ACNC: 0.55 UIU/ML (ref 0.27–4.2)
WBC # BLD AUTO: 5.16 10*3/MM3 (ref 3.4–10.8)

## 2020-11-13 PROCEDURE — 97140 MANUAL THERAPY 1/> REGIONS: CPT | Performed by: PHYSICAL THERAPIST

## 2020-11-13 NOTE — PROGRESS NOTES
Re-Assessment / Re-Certification        Patient: Tatyana Vazquez   : 2000  Diagnosis/ICD-10 Code:  Muscle spasm [M62.838]  Referring practitioner: Lupis Geller MD  Date of Initial Visit: Type: THERAPY  Noted: 10/7/2020  Today's Date: 2020  Patient seen for 4 sessions      Subjective:   Tatyana Vazquez reports: feels things are loosening up some in her muscles. Doing exercises. Hasn't tried tampon or intercourse.   Subjective Questionnaire: VQ 11  Clinical Progress: improved  Home Program Compliance: Yes  Treatment has included: therapeutic exercise, neuromuscular re-education, manual therapy, therapeutic activity and moist heat    -  Objective   verbal consent obtained for internal pelvic exam/treatment with declined need for second person in room     Palpation:   Supine:   Abdominals, Iliacus, psoas - mild tension B   Adductors - mild tension B proximally     See flowsheet for details of treatment following reassessment     Assessment/Plan   Pt is compliant with HEP and demonstrates decreasing tension of hip and abdominal mm today. She tolerated manual with mild discomfort that decreased with MFR and TPR. Pt has attended 3/5 visits at this time. Will continue manual and progress stretching and downtraining as indicated to further reduce symptoms    Progress toward previous goals: Partially Met    STG's: 4 weeks  · Patient will report > 25% reduction in overall pain - in progress  · Patient will demonstrate ability to relax pelvic floor mm 5/5 trials- in progress  · Patient will be I with dilator program for progress toward LTG -in progress  · Patient will be able to perform HEP with minimal verbal cues  - met     LTG's: By discharge  · Patient will be able to tolerate an internal pelvic exam/vaginal penetration with pain <2/10   · Patient will be able to have penetrative intercourse with no more than 2/10 discomfort for improved intimacy with spouse  · Patient will be independent with HEP          Recommendations: Continue as planned  Timeframe: 2 months  Prognosis to achieve goals: good    PT Signature: Cheko Viera PT      Based upon review of the patient's progress and continued therapy plan, it is my medical opinion that Tatyana Vazquez should continue physical therapy treatment at [facility] THERAPY  [address] [facility] [address] [phone].    Signature: __________________________________  Lupis Geller MD      6469/1430  Manual Therapy:    38     mins  07429;  Therapeutic Exercise:    0     mins  94734;     Neuromuscular Lisa:    3    mins  08147;    Therapeutic Activity:     0     mins  28837;     Gait Trainin     mins  71019;     Ultrasound:     0     mins  39187;    Electrical Stimulation:    0     mins  18129 ( );  Dry Needling     0     mins self-pay    Timed Treatment:   41   mins   Total Treatment:     41   mins

## 2020-11-17 ENCOUNTER — TREATMENT (OUTPATIENT)
Dept: PHYSICAL THERAPY | Facility: CLINIC | Age: 20
End: 2020-11-17

## 2020-11-17 DIAGNOSIS — M62.89 HIGH-TONE PELVIC FLOOR DYSFUNCTION IN FEMALE: ICD-10-CM

## 2020-11-17 DIAGNOSIS — M62.838 MUSCLE SPASM: Primary | ICD-10-CM

## 2020-11-17 PROCEDURE — 97140 MANUAL THERAPY 1/> REGIONS: CPT | Performed by: PHYSICAL THERAPIST

## 2020-11-17 PROCEDURE — 97112 NEUROMUSCULAR REEDUCATION: CPT | Performed by: PHYSICAL THERAPIST

## 2020-11-18 NOTE — PROGRESS NOTES
Physical Therapy Daily Progress Note  Patient: Tatyana Vazquez   : 2000  Diagnosis/ICD-10 Code:  Muscle spasm [M62.838]  Referring practitioner: Lupis Geller MD  Date of Initial Visit: Type: THERAPY  Noted: 10/7/2020  Today's Date: 2020  Patient seen for 5 sessions      Subjective   Tatyana Vazquez reports no change. Doing HEP daily.  Pain Rating (0-10): 0      Objective   verbal consent obtained for internal pelvic exam/treatment with declined need for second person in room     See Exercise, Manual, and Modality Logs for complete treatment.       Assessment/Plan  Implemented internal treatment and Pt able to tolerate to superficial pelvic floor mm with moderate discomfort that did decreased with diaphragmatic breathing and pebble dropping. Pt educated on dilator program. Will continue manual and progress internal treatment as tolerated.     Progress per Plan of Care         1430/1515   Timed:         Manual Therapy:    35     mins  65525;     Therapeutic Exercise:    0     mins  08933;     Neuromuscular Lisa:   8    mins  88420;    Therapeutic Activity:    2      mins  34642;     Gait Trainin     mins  17265;     Ultrasound:     0     mins  77560;    Ionto                               0    mins   54154  Self Care                       0     mins   25268  Canalith Repos               0    mins  27796    Un-Timed:  Electrical Stimulation:    0     mins  64948 ( );  Dry Needling     0     mins self-pay  Traction     0     mins 35078  Low Eval     0     Mins  02652  Mod Eval     0     Mins  72939  High Eval                       0     Mins  29827  Re-Eval                           0    mins  54371    Timed Treatment:   45   mins   Total Treatment:     45   mins    Cheko Viera PT  [place] License # [id number]  Physical Therapist

## 2020-12-02 ENCOUNTER — OFFICE VISIT (OUTPATIENT)
Dept: INTERNAL MEDICINE | Facility: CLINIC | Age: 20
End: 2020-12-02

## 2020-12-02 VITALS
WEIGHT: 124 LBS | TEMPERATURE: 97.1 F | HEART RATE: 112 BPM | BODY MASS INDEX: 21.97 KG/M2 | SYSTOLIC BLOOD PRESSURE: 116 MMHG | OXYGEN SATURATION: 98 % | HEIGHT: 63 IN | DIASTOLIC BLOOD PRESSURE: 62 MMHG

## 2020-12-02 DIAGNOSIS — F32.A ANXIETY AND DEPRESSION: Primary | ICD-10-CM

## 2020-12-02 DIAGNOSIS — F41.9 ANXIETY AND DEPRESSION: Primary | ICD-10-CM

## 2020-12-02 PROBLEM — F43.21 GRIEF REACTION: Status: ACTIVE | Noted: 2020-12-02

## 2020-12-02 PROCEDURE — 99213 OFFICE O/P EST LOW 20 MIN: CPT | Performed by: INTERNAL MEDICINE

## 2020-12-02 RX ORDER — PROMETHAZINE HYDROCHLORIDE 12.5 MG/1
12.5 TABLET ORAL EVERY 8 HOURS PRN
Qty: 30 TABLET | Refills: 0 | Status: SHIPPED | OUTPATIENT
Start: 2020-12-02

## 2020-12-02 NOTE — PROGRESS NOTES
"Chief Complaint   Patient presents with   • Abstract     Pt states she's had nausea for about a week and diarrhea off and on X 4 days.     Subjective   Tatyana Vazquez is a 20 y.o. female.     Here today with complaints of nausea for a week and diarrhea on and off for 4 days.  She has been very nauseous and had diarrhea since her  left her a week ago.  They had been having some drama, but she did not expect him to leave.  She came home from work Tuesday last week and he was gone.    Her nausea is constant.  She had some vomiting the first few days.  She tries to eat, but cannot eat much.   She is having loose stools about once a day.   She has some mild stomach pain.   She is not sleeping well right now.  She has been taking the buspar twice a day. She is taking 2 tabs at a time.   She works as an ICU nurse at [employer].  Currently she does not feel as though she can concentrate well enough to work.  She will bring me in [place] paperwork as she feels the need to take off work for a few weeks.  She did not work all week last week and does not want to work for approximately 1-2 more weeks.       The following portions of the patient's history were reviewed and updated as appropriate: allergies, current medications, past family history, past medical history, past social history, past surgical history and problem list.    Review of Systems   Constitutional: Positive for activity change and appetite change.   Gastrointestinal: Positive for diarrhea, nausea and vomiting.   Psychiatric/Behavioral: Positive for dysphoric mood and sleep disturbance. The patient is nervous/anxious.        Objective   /62   Pulse 112   Temp 97.1 °F (36.2 °C)   Ht 160 cm (62.99\")   Wt 56.2 kg (124 lb)   SpO2 98%   BMI 21.97 kg/m²   Body mass index is 21.97 kg/m².  Physical Exam  Vitals signs and nursing note reviewed.   Constitutional:       General: She is in acute distress.      Appearance: Normal appearance. She is " not ill-appearing.      Comments: Very pleasant young lady, she appears her age and is tearful throughout our interview   HENT:      Head: Normocephalic and atraumatic.      Right Ear: External ear normal.      Left Ear: External ear normal.   Eyes:      General:         Right eye: No discharge.         Left eye: No discharge.      Extraocular Movements: Extraocular movements intact.   Pulmonary:      Effort: Pulmonary effort is normal. No respiratory distress.   Abdominal:      General: Bowel sounds are normal.      Palpations: Abdomen is soft.      Tenderness: There is no abdominal tenderness.   Neurological:      General: No focal deficit present.      Mental Status: She is alert and oriented to person, place, and time. Mental status is at baseline.      Cranial Nerves: No cranial nerve deficit.      Motor: No weakness.      Gait: Gait normal.   Psychiatric:         Behavior: Behavior normal.         Thought Content: Thought content normal.         Judgment: Judgment normal.      Comments: Depressed mood today, patient is tearful throughout our interview         Assessment/Plan   Tatyana Vazquez is here today and the following problems have been addressed:      Diagnoses and all orders for this visit:    1. Anxiety and depression (Primary)    Other orders  -     promethazine (PHENERGAN) 12.5 MG tablet; Take 1 tablet by mouth Every 8 (Eight) Hours As Needed for Nausea or Vomiting.  Dispense: 30 tablet; Refill: 0    Continue BuSpar as needed for symptoms of anxiety  Patient is having grief reaction/situational depression to current home stressors  Encouragement and reassurance given to patient today  She states that her family has been very supportive and she does not feel need for counseling at this time  As noted above she will bring in [place] papers for me to complete for her  Given Phenergan 12.5 mg to take every 8 hours as needed for nausea  Recommend Imodium over-the-counter as needed for loose  stools    Return to clinic in 6 weeks for follow-up    Please note that portions of this note were completed with a voice recognition program.  Efforts were made to edit dictation, but occasionally words are mistranscribed.

## 2021-03-04 ENCOUNTER — OFFICE VISIT (OUTPATIENT)
Dept: INTERNAL MEDICINE | Facility: CLINIC | Age: 21
End: 2021-03-04

## 2021-03-04 VITALS
BODY MASS INDEX: 20.91 KG/M2 | DIASTOLIC BLOOD PRESSURE: 64 MMHG | SYSTOLIC BLOOD PRESSURE: 110 MMHG | OXYGEN SATURATION: 98 % | TEMPERATURE: 97.7 F | HEART RATE: 83 BPM | WEIGHT: 118 LBS | HEIGHT: 63 IN

## 2021-03-04 DIAGNOSIS — F41.9 ANXIETY: ICD-10-CM

## 2021-03-04 DIAGNOSIS — G44.209 TENSION HEADACHE: Primary | ICD-10-CM

## 2021-03-04 PROCEDURE — 99214 OFFICE O/P EST MOD 30 MIN: CPT | Performed by: PHYSICIAN ASSISTANT

## 2021-03-04 RX ORDER — LORATADINE 10 MG/1
10 TABLET ORAL DAILY
COMMUNITY
End: 2021-08-10

## 2021-03-04 RX ORDER — BACLOFEN 10 MG/1
10 TABLET ORAL NIGHTLY PRN
Qty: 30 TABLET | Refills: 1 | Status: SHIPPED | OUTPATIENT
Start: 2021-03-04

## 2021-03-04 RX ORDER — PROPRANOLOL HYDROCHLORIDE 10 MG/1
10 TABLET ORAL 2 TIMES DAILY PRN
Qty: 60 TABLET | Refills: 2 | Status: SHIPPED | OUTPATIENT
Start: 2021-03-04 | End: 2021-08-10

## 2021-03-04 NOTE — PROGRESS NOTES
"Chief Complaint  Headache (2-3 days a week)    Subjective          Tatyana Vazquez presents to [facility]  History of Present Illness  Here today with concern of headaches occurring several times per week which can occur at home or at work.  She has tried taking Imitrex which does not seem to help.  Excedrin or ibuprofen used to help but haven't the last couple of days.  Headaches tend to develop mid-day or early evening. Headache pain tends to be in the back of the head and somewhat into the occipital area.  Pain sometimes gets a little better after eating and drinking fluids. No associated visual disturbances or nausea as would typically occur with her migraines.  She denies any increase in migraine headache frequency, occurring less than once a month. She has been more stressed in the last couple of months. No medication changes in the last couple of months. She is using flonase and taking claritin daily which has not helped.  She does not think that she grinds or clenches her teeth and denies any apparent jaw pain.    She has been bothered by increased anxiety in recent months due to  from her  which was exacerbated by the recent death of her grandfather.  She has been taking Buspar 10 mg at bedtime only but has not noticed any benefit.  She has not tried taking BuSpar during the day.  When she is very anxious her heart tends to race.         Objective   Vital Signs:   /64   Pulse 83   Temp 97.7 °F (36.5 °C)   Ht 160 cm (62.99\")   Wt 53.5 kg (118 lb)   SpO2 98%   BMI 20.91 kg/m²     Physical Exam  Vitals signs and nursing note reviewed.   Constitutional:       General: She is not in acute distress.     Appearance: She is well-developed. She is not ill-appearing, toxic-appearing or diaphoretic.   HENT:      Head: Normocephalic and atraumatic.      Comments: Significant bilateral TMJ tenderness.     Right Ear: Ear canal and external ear normal. There is " no impacted cerumen.      Left Ear: Ear canal and external ear normal. There is no impacted cerumen.      Ears:      Comments: Trace bilateral TM effusions.     Nose: No rhinorrhea.      Comments: Erythematous and boggy nasal turbinates bilaterally.     Mouth/Throat:      Mouth: Mucous membranes are moist.      Pharynx: No posterior oropharyngeal erythema.   Eyes:      General: No visual field deficit or scleral icterus.     Extraocular Movements: Extraocular movements intact.      Conjunctiva/sclera: Conjunctivae normal.      Pupils: Pupils are equal, round, and reactive to light.   Neck:      Musculoskeletal: Normal range of motion and neck supple. Muscular tenderness present. No neck rigidity.   Cardiovascular:      Rate and Rhythm: Normal rate and regular rhythm.      Heart sounds: Normal heart sounds. No murmur. No friction rub. No gallop.    Pulmonary:      Effort: Pulmonary effort is normal. No respiratory distress.      Breath sounds: Normal breath sounds. No wheezing, rhonchi or rales.   Chest:      Chest wall: No tenderness.   Abdominal:      General: Bowel sounds are normal.      Palpations: Abdomen is soft.      Tenderness: There is no abdominal tenderness. There is no right CVA tenderness or left CVA tenderness.   Musculoskeletal: Normal range of motion.         General: No deformity.      Cervical back: She exhibits tenderness (bilateral upper trapezius muscles), bony tenderness ( C2-C3) and pain. She exhibits normal range of motion, no swelling, no edema, no deformity, no laceration, no spasm and normal pulse.        Back:       Right lower leg: No edema.      Left lower leg: No edema.   Lymphadenopathy:      Cervical: No cervical adenopathy.   Skin:     General: Skin is warm and dry.      Capillary Refill: Capillary refill takes less than 2 seconds.      Coloration: Skin is not pale.      Findings: No erythema, lesion or rash.   Neurological:      General: No focal deficit present.      Mental  Status: She is alert and oriented to person, place, and time.      Cranial Nerves: No cranial nerve deficit, dysarthria or facial asymmetry.      Sensory: Sensation is intact. No sensory deficit.      Motor: Motor function is intact. No weakness or abnormal muscle tone.      Coordination: Coordination is intact. Romberg sign negative. Coordination normal.      Gait: Gait is intact. Gait normal.      Deep Tendon Reflexes: Reflexes are normal and symmetric. Reflexes normal.   Psychiatric:         Attention and Perception: Attention and perception normal.         Mood and Affect: Mood is anxious (mild) and depressed (mild).         Speech: Speech normal.         Behavior: Behavior normal. Behavior is cooperative.         Thought Content: Thought content normal.         Cognition and Memory: Cognition and memory normal.         Judgment: Judgment normal.                     Assessment and Plan    Diagnoses and all orders for this visit:    1. Tension headache (Primary)  -     Begin nightly as needed: Baclofen (LIORESAL) 10 MG tablet; Take 1 tablet by mouth At Night As Needed for Muscle Spasms (headaches).  Dispense: 30 tablet; Refill: 1  Recommended she speak to her dentist about being fitted for a  as bilateral TMJ tenderness may indicate clenching of the teeth which can precipitate headaches.  Also recommended she try massage therapy and chiropractic care which may help headaches.    If headaches fail to improve will consider neurology referral.    2. Anxiety  -     Begin 3 times daily as needed: Propranolol (INDERAL) 10 MG tablet; Take 1 tablet by mouth 2 (Two) Times a Day As Needed (anxiety).  Dispense: 60 tablet; Refill: 2  She may also try increasing BuSpar to 3 times daily as needed as taking it at bedtime only will not help daytime anxiety.  She declined the need for antidepressant use at this time.      Follow Up   Return if symptoms worsen or fail to improve.  Patient was given instructions and  counseling regarding her condition or for health maintenance advice. Please see specific information pulled into the AVS if appropriate.

## 2021-07-29 DIAGNOSIS — J30.9 ALLERGIC RHINITIS WITH POSTNASAL DRIP: ICD-10-CM

## 2021-07-29 DIAGNOSIS — H65.93 OME (OTITIS MEDIA WITH EFFUSION), BILATERAL: ICD-10-CM

## 2021-07-29 DIAGNOSIS — R09.82 ALLERGIC RHINITIS WITH POSTNASAL DRIP: ICD-10-CM

## 2021-07-29 RX ORDER — FLUTICASONE PROPIONATE 50 MCG
2 SPRAY, SUSPENSION (ML) NASAL DAILY
Qty: 16 G | Refills: 11 | Status: SHIPPED | OUTPATIENT
Start: 2021-07-29

## 2021-08-10 ENCOUNTER — HOSPITAL ENCOUNTER (OUTPATIENT)
Dept: GENERAL RADIOLOGY | Facility: HOSPITAL | Age: 21
Discharge: HOME | End: 2021-08-10
Admitting: PHYSICIAN ASSISTANT

## 2021-08-10 ENCOUNTER — OFFICE VISIT (OUTPATIENT)
Dept: INTERNAL MEDICINE | Facility: CLINIC | Age: 21
End: 2021-08-10

## 2021-08-10 VITALS
SYSTOLIC BLOOD PRESSURE: 120 MMHG | TEMPERATURE: 97.5 F | HEART RATE: 105 BPM | DIASTOLIC BLOOD PRESSURE: 72 MMHG | WEIGHT: 122 LBS | BODY MASS INDEX: 21.62 KG/M2 | OXYGEN SATURATION: 99 % | HEIGHT: 63 IN

## 2021-08-10 DIAGNOSIS — M25.552 CHRONIC HIP PAIN, BILATERAL: ICD-10-CM

## 2021-08-10 DIAGNOSIS — G89.29 CHRONIC HIP PAIN, BILATERAL: ICD-10-CM

## 2021-08-10 DIAGNOSIS — M25.551 CHRONIC HIP PAIN, BILATERAL: ICD-10-CM

## 2021-08-10 DIAGNOSIS — M25.551 CHRONIC HIP PAIN, BILATERAL: Primary | ICD-10-CM

## 2021-08-10 DIAGNOSIS — M25.552 CHRONIC HIP PAIN, BILATERAL: Primary | ICD-10-CM

## 2021-08-10 DIAGNOSIS — M54.50 CHRONIC BILATERAL LOW BACK PAIN WITHOUT SCIATICA: ICD-10-CM

## 2021-08-10 DIAGNOSIS — G89.29 CHRONIC HIP PAIN, BILATERAL: Primary | ICD-10-CM

## 2021-08-10 DIAGNOSIS — G89.29 CHRONIC BILATERAL LOW BACK PAIN WITHOUT SCIATICA: ICD-10-CM

## 2021-08-10 PROCEDURE — 73521 X-RAY EXAM HIPS BI 2 VIEWS: CPT

## 2021-08-10 PROCEDURE — 72100 X-RAY EXAM L-S SPINE 2/3 VWS: CPT

## 2021-08-10 PROCEDURE — 99213 OFFICE O/P EST LOW 20 MIN: CPT | Performed by: PHYSICIAN ASSISTANT

## 2021-08-10 RX ORDER — FAMOTIDINE 40 MG/1
40 TABLET, FILM COATED ORAL 2 TIMES DAILY PRN
Qty: 180 TABLET | Refills: 1 | Status: SHIPPED | OUTPATIENT
Start: 2021-08-10

## 2021-08-10 NOTE — PROGRESS NOTES
Follow Up Office Visit      Patient Name: Tatyana Vazquez  : 2000   MRN: [id number]     Chief Complaint:    Chief Complaint   Patient presents with   • Hip Pain       History of Present Illness: Tatyana Vazquez is a 21 y.o. female who is here today with concern of bilateral hip pain for many years which was worse in the right hip this past weekend. Sometimes when she moves to stand up she gets a shooting pain in the hip. She has some low back pain which sometimes seems related to the hip but not always. Chiropractic care and NSAIDs help some. No known injury. No numbness, tingling or weakness in either leg.         Subjective      I have reviewed and the following portions of the patient's history were updated as appropriate: past family history, past medical history, past social history, past surgical history and problem list.      Current Outpatient Medications:   •  baclofen (LIORESAL) 10 MG tablet, Take 1 tablet by mouth At Night As Needed for Muscle Spasms (headaches)., Disp: 30 tablet, Rfl: 1  •  busPIRone (BUSPAR) 5 MG tablet, Take 1-2 tablets by mouth Every 8 (Eight) Hours As Needed (anxiety)., Disp: 180 tablet, Rfl: 3  •  fluticasone (Flonase) 50 MCG/ACT nasal spray, 2 sprays into the nostril(s) as directed by provider Daily., Disp: 16 g, Rfl: 11  •  glycopyrrolate (ROBINUL) 2 MG tablet, Take 1 tablet by mouth Every Night., Disp: 90 tablet, Rfl: 2  •  norethindrone-ethinyl estradiol FE (Junel FE 1/20) 1-20 MG-MCG per tablet, Take 1 tablet by mouth Daily. For three weeks then immediately start new pack and skip placebo week., Disp: 84 tablet, Rfl: 4  •  promethazine (PHENERGAN) 12.5 MG tablet, Take 1 tablet by mouth Every 8 (Eight) Hours As Needed for Nausea or Vomiting., Disp: 30 tablet, Rfl: 0  •  SUMAtriptan (Imitrex) 25 MG tablet, Take one tablet at onset of headache. May repeat dose one time in 2 hours if headache not relieved., Disp: 9 tablet, Rfl: 5  •  famotidine (Pepcid) 40 MG tablet,  "Take 1 tablet by mouth 2 (Two) Times a Day As Needed for Heartburn., Disp: 180 tablet, Rfl: 1    No Known Allergies    Objective     Physical Exam:  Vital Signs:   Vitals:    08/10/21 1311   BP: 120/72   Pulse: 105   Temp: 97.5 °F (36.4 °C)   SpO2: 99%   Weight: 55.3 kg (122 lb)   Height: 160 cm (62.99\")     Body mass index is 21.62 kg/m².    Physical Exam  Vitals and nursing note reviewed.   Constitutional:       General: She is not in acute distress.     Appearance: Normal appearance. She is well-developed and normal weight. She is not ill-appearing, toxic-appearing or diaphoretic.   HENT:      Head: Normocephalic and atraumatic.      Right Ear: External ear normal.      Left Ear: External ear normal.   Eyes:      General: No scleral icterus.     Extraocular Movements: Extraocular movements intact.      Conjunctiva/sclera: Conjunctivae normal.      Pupils: Pupils are equal, round, and reactive to light.   Cardiovascular:      Rate and Rhythm: Normal rate and regular rhythm.      Heart sounds: Normal heart sounds. No murmur heard.   No friction rub. No gallop.    Pulmonary:      Effort: Pulmonary effort is normal. No respiratory distress.      Breath sounds: Normal breath sounds. No wheezing, rhonchi or rales.   Chest:      Chest wall: No tenderness.   Musculoskeletal:         General: No tenderness, deformity or signs of injury.      Cervical back: Normal range of motion and neck supple.      Lumbar back: Bony tenderness (left SI joint, L4-S1 vertebrae ) present. No lacerations, spasms or tenderness. Normal range of motion. Negative right straight leg raise test and negative left straight leg raise test.      Right hip: Bony tenderness (greater trochanter) present. No deformity, lacerations, tenderness or crepitus. Decreased range of motion. Normal strength.      Left hip: No deformity, lacerations, tenderness, bony tenderness or crepitus. Normal range of motion. Normal strength.      Right lower leg: No edema.    "   Left lower leg: No edema.   Skin:     General: Skin is warm and dry.      Capillary Refill: Capillary refill takes less than 2 seconds.      Coloration: Skin is not jaundiced or pale.      Findings: No erythema or rash.   Neurological:      General: No focal deficit present.      Mental Status: She is alert and oriented to person, place, and time.      Cranial Nerves: No cranial nerve deficit.      Sensory: No sensory deficit.      Motor: No weakness or abnormal muscle tone.      Coordination: Coordination normal.      Gait: Gait normal.      Deep Tendon Reflexes: Reflexes normal.   Psychiatric:         Mood and Affect: Mood normal.         Behavior: Behavior normal.         Thought Content: Thought content normal.         Judgment: Judgment normal.           Common labs    Common Labsle 11/12/20 11/12/20    1334 1334   Glucose  75   BUN  10   Creatinine  0.72   eGFR Non  Am  101   eGFR African Am  123   Sodium  139   Potassium  4.4   Chloride  103   Calcium  8.9   Total Protein  6.4   Albumin  4.40   Total Bilirubin  0.3   Alkaline Phosphatase  52   AST (SGOT)  21   ALT (SGPT)  16   WBC 5.16    Hemoglobin 12.9    Hematocrit 38.1    Platelets 226                Assessment / Plan      Assessment/Plan:   Diagnoses and all orders for this visit:    1. Chronic hip pain, bilateral (Primary)  -     XR Hips Bilateral With or Without Pelvis 2 View; Future    2. Chronic bilateral low back pain without sciatica  -     XR Spine Lumbar 2 or 3 View; Future    We will consider orthopedic surgery referral after review of x-rays.  Continue chiropractic care if desired.  Continue sparing use of NSAIDs if helpful.        Follow Up:   Return if symptoms worsen or fail to improve.    Patient was given instructions and counseling regarding her condition or for health maintenance advice. Please see specific information pulled into the AVS if appropriate.     Anika Jaramillo PA-C  [facility]     Please  note that portions of this note may have been completed with a voice recognition program. Efforts were made to edit the dictations, but occasionally words are mistranscribed.

## 2021-11-05 ENCOUNTER — OFFICE VISIT (OUTPATIENT)
Dept: OBSTETRICS AND GYNECOLOGY | Facility: CLINIC | Age: 21
End: 2021-11-05

## 2021-11-05 VITALS
BODY MASS INDEX: 21.69 KG/M2 | DIASTOLIC BLOOD PRESSURE: 78 MMHG | WEIGHT: 122.4 LBS | SYSTOLIC BLOOD PRESSURE: 124 MMHG | HEIGHT: 63 IN

## 2021-11-05 DIAGNOSIS — Z11.3 ROUTINE SCREENING FOR STI (SEXUALLY TRANSMITTED INFECTION): ICD-10-CM

## 2021-11-05 DIAGNOSIS — Z01.419 WELL WOMAN EXAM WITH ROUTINE GYNECOLOGICAL EXAM: Primary | ICD-10-CM

## 2021-11-05 DIAGNOSIS — Z76.89 ESTABLISHING CARE WITH NEW DOCTOR, ENCOUNTER FOR: ICD-10-CM

## 2021-11-05 DIAGNOSIS — R87.610 PAP SMEAR ABNORMALITY OF CERVIX WITH ASCUS FAVORING BENIGN: ICD-10-CM

## 2021-11-05 PROCEDURE — 99395 PREV VISIT EST AGE 18-39: CPT | Performed by: OBSTETRICS & GYNECOLOGY

## 2021-11-05 RX ORDER — NORETHINDRONE ACETATE AND ETHINYL ESTRADIOL 1MG-20(21)
1 KIT ORAL DAILY
Qty: 84 TABLET | Refills: 4 | Status: SHIPPED | OUTPATIENT
Start: 2021-11-05 | End: 2022-12-30

## 2021-11-05 NOTE — PROGRESS NOTES
Subjective   Chief Complaint   Patient presents with   • Gynecologic Exam     annual.     Tatyana Vazquez is a 21 y.o. year old  presenting to be seen for her annual exam.     SEXUAL Hx:  She is not currently sexually active.  In the past year there she has not been sexually active.    Condoms are not needed because she is not sexually active.  She would not like to be screened for STD's at today's exam.  Current birth control method: abstinence.  She is happy with her current method of contraception and does not want to discuss alternative methods of contraception.  MENSTRUAL Hx:  Patient's last menstrual period was 10/21/2021 (within days).  In the past 6 months her cycles have been regular, predictable and occur monthly.  Her menstrual flow is typically normal.   Each month on average there are roughly 0 day(s) of very heavy flow.    Intermenstrual bleeding is absent.    Post-coital bleeding is n/a  Dysmenorrhea: mild and is not affecting her activities of daily living  PMS: none  Her cycles are not a source of concern for her that she wishes to discuss today.  HEALTH Hx:  She exercises regularly: no (but is planning to start exercising more ).  She wears her seat belt: yes.  She has concerns about domestic violence: no.  OTHER THINGS SHE WANTS TO DISCUSS TODAY:  Her  left her about a year ago. She has moved to [place] and is overall doing okay.     The following portions of the patient's history were reviewed and updated as appropriate:problem list, current medications, allergies, past family history, past medical history, past social history and past surgical history.    Social History    Tobacco Use      Smoking status: Never Smoker      Smokeless tobacco: Never Used    Review of Systems  Constitutional POS: nothing reported    NEG: anorexia or night sweats   Genitourinary POS: nothing reported    NEG: dysuria or hematuria      Gastointestinal POS: nothing reported    NEG: bloating, change in  "bowel habits, melena or reflux symptoms   Integument POS: nothing reported    NEG: moles that are changing in size, shape, color or rashes   Breast POS: nothing reported    NEG: persistent breast lump, skin dimpling or nipple discharge        Objective   /78   Ht 160 cm (63\")   Wt 55.5 kg (122 lb 6.4 oz)   LMP 10/21/2021 (Within Days)   Breastfeeding No   BMI 21.68 kg/m²     General:  well developed; well nourished  no acute distress   Skin:  No suspicious lesions seen   Thyroid: normal to inspection and palpation   Breasts:  Examined in supine position  Symmetric without masses or skin dimpling  Nipples normal without inversion, lesions or discharge  There are no palpable axillary nodes   Abdomen: soft, non-tender; no masses  no umbilical or inguinal hernias are present  no hepato-splenomegaly   Pelvis: Clinical staff was present for exam  External genitalia:  normal appearance of the external genitalia including. Small mole on right lower vulva Bartholin's and Gooding's glands.  :  urethral meatus normal;  Vaginal:  not seen as patient is intolerant to insertional speculum exam  Cervix:  not seen - see above  Uterus:  normal size, shape and consistency.  Adnexa:  normal bimanual exam of the adnexa.  Rectal:  digital rectal exam not performed; anus visually normal appearing.        Assessment   1. Normal GYN exam  2. History of ASCUS pap     Plan   Pap (blindly) was done today.  If she does not receive the results of the Pap within 2 weeks  time, she was instructed to call to find out the results.  I explained to Tatyana that the recommendations for Pap smear interval in a low risk patient's has lengthened to 3 years time.  I encouraged her to be seen yearly for a full physical exam including breast and pelvic exam even during the off years when PAP's will not be performed.  The following tests were ordered today: STD swabs for GC, chlamydia and trichimoniasis.  It was explained to Tatyana that all lab " test should be back within the one week after they are performed. She will be notified about the results, regardless of the findings. If she has not been contacted by the office within 2 weeks after the test has been performed, it is her responsibility to contact us to learn about her results  Prescription(s) for OCP's were refilled today    Derm referral placed  I reviewed with Tatyana acevedo to look for in evaluating for melanoma.  If a mole is Asymmetric, has irregular Borders or contours, is black in Color, has a Diameter greater than the size of a pencil eraser or is changing (Evolving) in size/shape, it needs to be evaluated promptly.  The importance of keeping all planned follow-up and taking all medications as prescribed was emphasized.  Follow up for annual exam in one year    New Medications Ordered This Visit   Medications   • norethindrone-ethinyl estradiol FE (Junel FE 1/20) 1-20 MG-MCG per tablet     Sig: Take 1 tablet by mouth Daily. For three weeks then immediately start new pack and skip placebo week.     Dispense:  84 tablet     Refill:  4          This note was electronically signed.    Lupis eGller MD  November 5, 2021    Note: Speech recognition transcription software may have been used to create portions of this document.  An attempt at proofreading has been made but errors in transcription could still be present.

## 2021-12-20 ENCOUNTER — TRANSCRIBE ORDERS (OUTPATIENT)
Dept: ADMINISTRATIVE | Facility: HOSPITAL | Age: 21
End: 2021-12-20

## 2021-12-20 DIAGNOSIS — M25.551 PAIN IN RIGHT HIP: Primary | ICD-10-CM

## 2022-02-28 DIAGNOSIS — F41.9 ANXIETY: ICD-10-CM

## 2022-03-01 RX ORDER — BUSPIRONE HYDROCHLORIDE 5 MG/1
TABLET ORAL
Qty: 180 TABLET | Refills: 0 | Status: SHIPPED | OUTPATIENT
Start: 2022-03-01

## 2022-03-10 ENCOUNTER — TRANSCRIBE ORDERS (OUTPATIENT)
Dept: ADMINISTRATIVE | Facility: HOSPITAL | Age: 22
End: 2022-03-10

## 2022-03-10 DIAGNOSIS — M25.551 RIGHT HIP PAIN: Primary | ICD-10-CM

## 2023-10-05 NOTE — PROGRESS NOTES
Subjective   Tatyana Maldonado is a 16 y.o. female present today with flu-like symptoms which began on Tuesday. She was around her brother who recently had the flu.  Denies chest pain or shortness of breath.  She has not tried any medications to help relieve symptoms.    Fever    This is a new problem. The current episode started in the past 7 days. The maximum temperature noted was 99 to 99.9 F. Associated symptoms include coughing, headaches and a sore throat.   Headache    Associated symptoms include coughing, a fever and a sore throat.   Sore Throat    Associated symptoms include coughing and headaches.   Cough   Associated symptoms include a fever, headaches and a sore throat.       The following portions of the patient's history were reviewed and updated as appropriate: allergies, current medications, past family history, past medical history, past social history, past surgical history and problem list.    Review of Systems   Constitutional: Positive for fever.   HENT: Positive for sore throat.    Respiratory: Positive for cough.    Neurological: Positive for headaches.     Objective    Vitals:    04/06/17 1253   BP: 112/62   Pulse: 90   Resp: 14   Temp: 98.3 °F (36.8 °C)   SpO2: 98%     Physical Exam   Constitutional: She appears well-developed and well-nourished. No distress.   HENT:   Head: Normocephalic.   Mouth/Throat: Mucous membranes are dry. Posterior oropharyngeal erythema present.   Cardiovascular: Normal rate, regular rhythm, normal heart sounds and intact distal pulses.    Pulmonary/Chest: Effort normal and breath sounds normal. No respiratory distress. She has no wheezes. She exhibits no tenderness.   Neurological: She is alert.   Skin: Skin is warm and dry.   Nursing note and vitals reviewed.      Assessment/Plan   Tatyana was seen today for generalized body aches, fever, headache, sore throat and cough.    Diagnoses and all orders for this visit:    Acute pharyngitis, unspecified etiology  -      POCT Influenza A/B Negative  Treat symptomatically.  Keep throat moisten. May use chloraseptic spray for discomfort.  Gargle with warm saltwater or use throat lozenges to relieve the sore throat.   Good oral hygiene and change out toothbrushes.  Worsening signs and symptoms discussed.  Rest, fluids, Motrin, and humidification.  Try eating soft, easy-to-swallow foods.  Follow up as needed for persistent, worsening cough, or appearance of new symptoms such as difficulty swallowing or excessive drooling    Maria Antonia Brasher, APRN  04/06/2017                 No

## 2024-11-19 NOTE — TELEPHONE ENCOUNTER
Is this ok?   [No Acute Distress] : no acute distress [Well Nourished] : well nourished [Well Developed] : well developed [Well-Appearing] : well-appearing [Normal Sclera/Conjunctiva] : normal sclera/conjunctiva [PERRL] : pupils equal round and reactive to light [EOMI] : extraocular movements intact [Normal Outer Ear/Nose] : the outer ears and nose were normal in appearance [Normal Oropharynx] : the oropharynx was normal [No JVD] : no jugular venous distention [No Lymphadenopathy] : no lymphadenopathy [Supple] : supple [Thyroid Normal, No Nodules] : the thyroid was normal and there were no nodules present [No Respiratory Distress] : no respiratory distress  [No Accessory Muscle Use] : no accessory muscle use [Clear to Auscultation] : lungs were clear to auscultation bilaterally [Normal Rate] : normal rate  [Regular Rhythm] : with a regular rhythm [Normal S1, S2] : normal S1 and S2 [No Murmur] : no murmur heard [No Carotid Bruits] : no carotid bruits [No Varicosities] : no varicosities [Pedal Pulses Present] : the pedal pulses are present [No Edema] : there was no peripheral edema [No Extremity Clubbing/Cyanosis] : no extremity clubbing/cyanosis [Soft] : abdomen soft [Non Tender] : non-tender [Non-distended] : non-distended [Normal Bowel Sounds] : normal bowel sounds [Normal Posterior Cervical Nodes] : no posterior cervical lymphadenopathy [Normal Anterior Cervical Nodes] : no anterior cervical lymphadenopathy [No CVA Tenderness] : no CVA  tenderness [No Spinal Tenderness] : no spinal tenderness [No Joint Swelling] : no joint swelling [Grossly Normal Strength/Tone] : grossly normal strength/tone [No Rash] : no rash [Coordination Grossly Intact] : coordination grossly intact [No Focal Deficits] : no focal deficits [Normal Gait] : normal gait [Normal Affect] : the affect was normal [Normal Insight/Judgement] : insight and judgment were intact [Alert and Oriented x3] : oriented to person, place, and time